# Patient Record
Sex: FEMALE | Race: WHITE | NOT HISPANIC OR LATINO | Employment: FULL TIME | URBAN - METROPOLITAN AREA
[De-identification: names, ages, dates, MRNs, and addresses within clinical notes are randomized per-mention and may not be internally consistent; named-entity substitution may affect disease eponyms.]

---

## 2018-05-30 ENCOUNTER — HOSPITAL ENCOUNTER (EMERGENCY)
Facility: MEDICAL CENTER | Age: 48
End: 2018-05-30
Attending: EMERGENCY MEDICINE
Payer: COMMERCIAL

## 2018-05-30 VITALS
BODY MASS INDEX: 42.52 KG/M2 | RESPIRATION RATE: 16 BRPM | HEIGHT: 66 IN | HEART RATE: 85 BPM | SYSTOLIC BLOOD PRESSURE: 125 MMHG | DIASTOLIC BLOOD PRESSURE: 88 MMHG | OXYGEN SATURATION: 97 % | WEIGHT: 264.55 LBS | TEMPERATURE: 97.8 F

## 2018-05-30 DIAGNOSIS — M79.601 PAIN OF RIGHT UPPER EXTREMITY: ICD-10-CM

## 2018-05-30 PROCEDURE — 99283 EMERGENCY DEPT VISIT LOW MDM: CPT

## 2018-05-30 RX ORDER — TRAMADOL HYDROCHLORIDE 50 MG/1
50 TABLET ORAL EVERY 8 HOURS PRN
Qty: 10 TAB | Refills: 0 | Status: SHIPPED | OUTPATIENT
Start: 2018-05-30 | End: 2018-06-02

## 2018-05-30 RX ORDER — SERTRALINE HYDROCHLORIDE 100 MG/1
200 TABLET, FILM COATED ORAL
COMMUNITY

## 2018-05-30 RX ORDER — CEPHALEXIN 500 MG/1
500 CAPSULE ORAL 4 TIMES DAILY
Qty: 40 CAP | Refills: 0 | Status: SHIPPED
Start: 2018-05-30 | End: 2021-12-12

## 2018-05-30 RX ORDER — OMEPRAZOLE 40 MG/1
40 CAPSULE, DELAYED RELEASE ORAL EVERY MORNING
COMMUNITY

## 2018-05-30 RX ORDER — HYDROCODONE BITARTRATE AND ACETAMINOPHEN 10; 325 MG/1; MG/1
1 TABLET ORAL
COMMUNITY

## 2018-05-30 ASSESSMENT — PAIN SCALES - GENERAL
PAINLEVEL_OUTOF10: 6
PAINLEVEL_OUTOF10: 4

## 2018-05-30 NOTE — DISCHARGE INSTRUCTIONS
"Carpal Tunnel Surgery  The carpal tunnel is a narrow hollow area in the wrist. It is formed by the wrist bones and ligaments. Nerves, blood vessels, and tendons on the palm side of your hand pass through the carpal tunnel. (The palm side is the side of your hand in the direction your fingers bend.) Repeated wrist motion or certain diseases may cause swelling within the tunnel. That is why these are sometimes called repetitive trauma disorders. It is also a common problem in late pregnancy because of water retention. This swelling pinches the main nerve in the wrist (median nerve). It causes the painful condition called carpal tunnel syndrome. A feeling of \"pins and needles\" may be noticed in the fingers or hand. The entire arm may ache from this condition. Carpal tunnel syndrome may clear up by itself. Cortisone injections may help. An electromyogram may be needed to confirm this diagnosis. This is a test which measures nerve conduction. The nerve conduction is usually slowed in a carpal tunnel syndrome. Sometimes, an operation may be needed to free the pinched nerve.   LET YOUR CAREGIVER KNOW ABOUT:  · Allergies  · Medications taken including herbs, eye drops, over the counter medications, and creams  · Use of steroids (by mouth or creams)  · Previous problems with anesthetics or novocaine  · Possibility of pregnancy, if this applies  · History of blood clots (thrombophlebitis)  · History of bleeding or blood problems  · Previous surgery  · Other health problems  RISKS AND COMPLICATIONS  · Infection: A germ starts growing in the wound. This can usually be treated with antibiotics.  · Damage to other organs may occur.  · Bleeding following surgery can be a complication of almost all surgeries. Your surgeon takes every precaution to keep this from happening.  · Recurrence (return) of carpal tunnel syndrome following treatment is rare.  BEFORE THE PROCEDURE  · Stop smoking at least two weeks prior to surgery. This " lowers risk during surgery.  · Stop non steroidal medicine for ten days prior to surgery. Also, do not take aspirin unless OK'd by your surgeon.  · Your caregiver may tell you to stop taking certain medicine that may affect the outcome of the surgery and your ability to heal. For example, you may need to stop taking anti-inflammatories, such as aspirin, because of possible bleeding problems. Other medicine may have interactions with anesthesia.  · BE SURE TO LET YOUR CAREGIVER KNOW IF YOU HAVE BEEN ON STEROIDS (INCLUDING CREAMS) FOR LONG PERIODS OF TIME. THIS IS CRITICAL.  · Your caregiver will discuss possible risks and complications with you before surgery. In addition to the usual risks of anesthesia, other common risks and complications include:  · Temporary increase in pain due to surgery.  · Uncorrected pain.  · Infection.  You should be present 60 minutes before your procedure or as directed.   PROCEDURE  · Carpal tunnel release is generally recommended if symptoms last for 6 months. Surgery involves severing the band of tissue around the wrist to reduce pressure on the median nerve. Surgery is done under local anesthesia and does not require an overnight hospital stay. Many patients require surgery on both hands. The following are types of carpal tunnel release surgery:  · Open release surgery, the traditional procedure used to correct carpal tunnel syndrome, consists of making an incision up to 2 inches in the wrist and then cutting the carpal ligament to enlarge the carpal tunnel. The procedure is generally done under local anesthesia on an outpatient basis, unless there are unusual medical considerations.  · Endoscopic surgery may allow faster functional recovery and less post-operative discomfort than traditional open release surgery. The surgeon makes two incisions (cuts) (about 1/2 inch each) in the wrist and palm, inserts a camera attached to a tube, looks at the tissue on a screen, and cuts the  carpal ligament (the tissue that holds joints together). This two-portal endoscopic surgery, generally performed under local anesthesia, is effective and minimizes scarring and scar tenderness, if any. One-portal endoscopic surgery for carpal tunnel syndrome is also available.  Although symptoms may be better right after surgery, full recovery from carpal tunnel surgery can take months. Some patients may have infection, nerve damage, stiffness, and pain at the scar. Sometimes the wrist loses strength because the carpal ligament is cut. Patients should take part in physical therapy after surgery to restore wrist strength. Some patients may need to adjust job duties or even change jobs after recovery from surgery.  The majority of patients recover completely without complications (additional problems).  AFTER THE PROCEDURE  After surgery, you will be taken to the recovery area where a nurse will watch and check your progress. Once you're awake, stable, and taking fluids well, without other problems you will be allowed to go home.  HOME CARE INSTRUCTIONS   · Once at home, an ice pack applied to your operative site may help with discomfort and keep the swelling down.  · Follow your caregiver's instructions as to activities, exercises, physical therapy, and driving a car.  · Maintain strength and range of motion as instructed.  · If you were given a splint to keep your wrist from bending, use it as instructed. It is important to wear the splint at night. Use the splint for as long as you have pain or numbness in your hand, arm or wrist. This may take 1 to 2 months.  · If you have pain at night, it may help to elevate your hand above the level of your heart (the center of your chest).  · It is important to avoid activities which originally caused your carpal tunnel syndrome for a couple weeks following surgery, or as directed by your surgeon. If your symptoms are work-related, you may need to talk to your employer about  changing to a job that does not require using your wrist.  · Only take over-the-counter or prescription medicines for pain, discomfort, or fever as directed by your caregiver.  · Following periods of extended use, particularly hard (strenuous) use, apply an ice pack wrapped in a towel to the palm (anterior) side of the affected wrist for 20 to 30 minutes. Repeat as needed three to four times per day. This will help reduce swelling following surgery.  SEEK MEDICAL CARE IF:   · There is increased bleeding (more than a small spot) from the wound.  · You notice redness, swelling, or increasing pain in the wound.  · Pus is coming from wound.  · An unexplained oral temperature above 102° F (38.9° C) develops.  · You notice a foul smell coming from the wound or dressing.  SEEK IMMEDIATE MEDICAL CARE IF:   · You develop a rash.  · You have difficulty breathing.  · You have any problems you think are related to allergies.  Document Released: 08/01/2005 Document Revised: 03/11/2013 Document Reviewed: 10/23/2008  [employer]® Patient Information ©2014 [employer].

## 2018-05-30 NOTE — ED PROVIDER NOTES
"ED Provider Note    CHIEF COMPLAINT  Chief Complaint   Patient presents with   • Post-Op Pain     s/p rt hand carpal tunnel surgery, 5d ago, +bruising, +swelling       HPI  Janine Capellan is a 47 y.o. female here for evaluation of post op pain.     PAST MEDICAL HISTORY   has a past medical history of GERD (gastroesophageal reflux disease).    SOCIAL HISTORY  Social History     Social History Main Topics   • Smoking status: Current Some Day Smoker     Packs/day: 0.25   • Smokeless tobacco: Not on file   • Alcohol use Yes      Comment: occational    • Drug use: No   • Sexual activity: Not on file       SURGICAL HISTORY   has a past surgical history that includes hysterectomy laparoscopy and other orthopedic surgery.    CURRENT MEDICATIONS  Home Medications     Reviewed by Zve Rodriguez R.N. (Registered Nurse) on 05/30/18 at 1429  Med List Status: Partial   Medication Last Dose Status   Hydrocodone-Acetaminophen (NORCO PO)  Active   HYDROXYZINE PAMOATE PO  Active   OMEPRAZOLE PO  Active   quetiapine (SEROQUEL) 100 MG TABS  Active   quetiapine (SEROQUEL) 25 MG TABS  Active   SERTRALINE HCL PO  Active   vitamin D 2000 UNIT TABS  Active                ALLERGIES  No Known Allergies    REVIEW OF SYSTEMS  See HPI for further details. Review of systems as above, otherwise all other systems are negative.     PHYSICAL EXAM  VITAL SIGNS: /84   Pulse 95   Temp 36.6 °C (97.8 °F) (Temporal)   Resp 16   Ht 1.676 m (5' 6\")   Wt 120 kg (264 lb 8.8 oz)   LMP 10/24/2009   SpO2 92%   BMI 42.70 kg/m²     Constitutional: Well developed, well nourished. No acute distress.  HEENT: Normocephalic, atraumatic. MMM  Neck: Supple, Full range of motion   Chest/Pulmonary:  No respiratory distress.  Equal expansion   Musculoskeletal: No deformity, no edema, neurovascular intact.  Right upper ext:  Incision site to the volar wrist.   Mild edema noted to the distal volar wrist.   No erythema. No induration.  No drainage or exudate " from the incision site. n/v intact distally.   Neuro: Clear speech, appropriate, cooperative, cranial nerves II-XII grossly intact.  Psych: Normal mood and affect      PROCEDURES     MEDICAL RECORD  I have reviewed patient's medical record and pertinent results are listed above.    COURSE & MEDICAL DECISION MAKING  I have reviewed any medical record information, laboratory studies and radiographic results as noted above.    3:06 PM  The pt was placed in a splint, and given antibiotics secondary to the recent surgery (on May 25 th), but there  Is no current signs of infection.  There is a slight bogginess to the arm, but no actual abscess or induration.  The pain is only in the area surrounding the incision site, she has good cap refill, and she Is neurovascularly intact distally.  No need for an u/s at this time.      I you have had any blood pressure issues while here in the emergency department, please see your doctor for a further evaluation or work up.    Differential diagnoses include but not limited to: dvt, abscess, cellulitis, post op pain    This patient presents with right arm pain.  At this time, I have counseled the patient/family regarding their medications, pain control, and follow up.  They will continue their medications, if any, as prescribed.  They will return immediately for any worsening symptoms and/or any other medical concerns.  They will see their doctor, or contact the doctor provided, in 1-2 days for follow up.       FINAL IMPRESSION  1. Pain of right upper extremity            Electronically signed by: Philip Yo, 5/30/2018 3:04 PM

## 2018-05-30 NOTE — ED TRIAGE NOTES
Pt amb to triage.  Chief Complaint   Patient presents with   • Post-Op Pain     s/p rt hand carpal tunnel surgery, 5d ago, +bruising, +swelling

## 2021-10-25 ENCOUNTER — APPOINTMENT (OUTPATIENT)
Dept: RADIOLOGY | Facility: MEDICAL CENTER | Age: 51
End: 2021-10-25
Attending: EMERGENCY MEDICINE
Payer: COMMERCIAL

## 2021-10-25 ENCOUNTER — HOSPITAL ENCOUNTER (EMERGENCY)
Facility: MEDICAL CENTER | Age: 51
End: 2021-10-25
Attending: EMERGENCY MEDICINE
Payer: COMMERCIAL

## 2021-10-25 VITALS
OXYGEN SATURATION: 95 % | HEART RATE: 110 BPM | WEIGHT: 275.57 LBS | BODY MASS INDEX: 44.29 KG/M2 | SYSTOLIC BLOOD PRESSURE: 166 MMHG | DIASTOLIC BLOOD PRESSURE: 86 MMHG | HEIGHT: 66 IN | TEMPERATURE: 97.1 F | RESPIRATION RATE: 17 BRPM

## 2021-10-25 DIAGNOSIS — S52.501A CLOSED FRACTURE DISTAL RADIUS AND ULNA, RIGHT, INITIAL ENCOUNTER: ICD-10-CM
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
## 2021-10-25 DIAGNOSIS — S80.211A ABRASION OF RIGHT KNEE, INITIAL ENCOUNTER: ICD-10-CM

## 2021-10-25 DIAGNOSIS — S52.601A CLOSED FRACTURE DISTAL RADIUS AND ULNA, RIGHT, INITIAL ENCOUNTER: ICD-10-CM

## 2021-10-25 PROCEDURE — 302874 HCHG BANDAGE ACE 2 OR 3""

## 2021-10-25 PROCEDURE — A9270 NON-COVERED ITEM OR SERVICE: HCPCS | Performed by: EMERGENCY MEDICINE

## 2021-10-25 PROCEDURE — 73110 X-RAY EXAM OF WRIST: CPT | Mod: RT

## 2021-10-25 PROCEDURE — 99284 EMERGENCY DEPT VISIT MOD MDM: CPT

## 2021-10-25 PROCEDURE — 29125 APPL SHORT ARM SPLINT STATIC: CPT

## 2021-10-25 PROCEDURE — 700102 HCHG RX REV CODE 250 W/ 637 OVERRIDE(OP): Performed by: EMERGENCY MEDICINE

## 2021-10-25 PROCEDURE — 73562 X-RAY EXAM OF KNEE 3: CPT | Mod: RT

## 2021-10-25 RX ORDER — HYDROCODONE BITARTRATE AND ACETAMINOPHEN 5; 325 MG/1; MG/1
1 TABLET ORAL ONCE
Status: COMPLETED | OUTPATIENT
Start: 2021-10-25 | End: 2021-10-25

## 2021-10-25 RX ORDER — HYDROCODONE BITARTRATE AND ACETAMINOPHEN 5; 325 MG/1; MG/1
1 TABLET ORAL EVERY 6 HOURS PRN
Qty: 10 TABLET | Refills: 0 | Status: SHIPPED | OUTPATIENT
Start: 2021-10-25 | End: 2021-10-28

## 2021-10-25 RX ADMIN — HYDROCODONE BITARTRATE AND ACETAMINOPHEN 1 TABLET: 5; 325 TABLET ORAL at 21:43

## 2021-10-25 RX ADMIN — HYDROCODONE BITARTRATE AND ACETAMINOPHEN 1 TABLET: 5; 325 TABLET ORAL at 19:28

## 2021-10-26 NOTE — ED NOTES
Splint applied by ED tech and pt medicated per MAR. Reviewed discharge instructions, pt verbalized understanding of instructions and medication. States she will schedule follow-up appointment. Denies further questions at this time. Pt ambulatory out of ER with steady gait.

## 2021-10-26 NOTE — ED TRIAGE NOTES
Pt amb to triage.  Chief Complaint   Patient presents with   • T-5000 GLF     slip and fall on the ice    • Wrist Pain     rt   • Knee Pain     rt     Rt wrist swelling noted. +CMS. Rt arm placed in a sling, ice applied.

## 2021-10-26 NOTE — ED PROVIDER NOTES
ED Provider Note    Scribed for Bia Mar M.D. by Radha Scott. 10/25/2021  6:59 PM    Primary care provider: Pcp Pt States None  Means of arrival: walk-in  History obtained from: patient  History limited by: none    CHIEF COMPLAINT  Chief Complaint   Patient presents with    T-5000 GLF     slip and fall on the ice     Wrist Pain     rt    Knee Pain     rt       HPI  Janine Capellan is a 51 y.o. female who presents to the Emergency Department for evaluation injuries following a ground level fall onset 4:15 PM today. The patient was out at [place] playing in the snow with her children when she trip on her and fell on ice. She reports also having mild right knee pain. She's had moderate right wrist pain that starts from her right thumb/wrist and radiates to her right elbow. She admits to associated symptoms of right wrist swelling and a sore right shoulder, but denies fevers or numbness. No alleviating factors were reported. 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 She denies breaking her right wrist before. She denies any use of daily medication.    REVIEW OF SYSTEMS  Neuro: no numbness  Musculoskeletal: Right wrist pain, right wrist swelling, sore right shoulder, right knee pain  Endocrine: no fevers    See history of present illness. All other systems are negative. E.    PAST MEDICAL HISTORY   has a past medical history of GERD (gastroesophageal reflux disease).    SURGICAL HISTORY   has a past surgical history that includes hysterectomy laparoscopy and other orthopedic surgery.    SOCIAL HISTORY  Social History     Tobacco Use    Smoking status: Current Some Day Smoker     Packs/day: 0.25   Substance Use Topics    Alcohol use: Yes     Comment: occational     Drug use: No      Social History     Substance and Sexual Activity   Drug Use No       FAMILY HISTORY  No family history reported.     CURRENT MEDICATIONS  Home Medications       Reviewed by Zev Rodriguez R.N. (Registered Nurse) on 10/25/21 at  "1758  Med List Status: Partial     Medication Last Dose Status   cephALEXin (KEFLEX) 500 MG Cap  Active   Hydrocodone-Acetaminophen (NORCO PO)  Active   HYDROXYZINE PAMOATE PO  Active   OMEPRAZOLE PO  Active   quetiapine (SEROQUEL) 100 MG TABS  Active   quetiapine (SEROQUEL) 25 MG TABS  Active   SERTRALINE HCL PO  Active   vitamin D 2000 UNIT TABS  Active                    ALLERGIES  No Known Allergies    PHYSICAL EXAM  VITAL SIGNS: BP (!) 181/95   Pulse 78   Temp 35.8 °C (96.5 °F) (Temporal)   Resp 16   Ht 1.676 m (5' 6\")   Wt 125 kg (275 lb 9.2 oz)   LMP 10/24/2009   SpO2 93%   BMI 44.48 kg/m²     Constitutional: No distress  Musculoskeletal: Abrasion on right knee, history of tibial subluxation which was hurting before the injury, Right wrist has a deformity over radius and ulna, Tenderness over right thumb, decreased range and pain in right wrist.  Vascular: warm to touch good capillary refill   Neurologic: distally neurovascularly intact  Psychiatric: Affect normal     DIAGNOSTIC STUDIES / PROCEDURES    RADIOLOGY  DX-WRIST-COMPLETE 3+ RIGHT   Final Result      1.  Comminuted impacted acute fracture of the distal radius.      2.  Styloid fracture also identified.      DX-KNEE 3 VIEWS RIGHT   Final Result         1.  No acute traumatic bony injury.        The radiologist's interpretation of all radiological studies have been reviewed by me.    COURSE & MEDICAL DECISION MAKING  Nursing notes, VS, PMSFHx reviewed in chart.    6:59 PM Patient seen and examined at bedside. Patient will be treated with Norco 5 - 325 mg tablet.  Ordered DX-knee-right and DX-wrist-right to evaluate her symptoms. The differential diagnoses include but are not limited to: fracture vs contusion     8:47 PM Patient was reevaluated at bedside. Discussed radiology results with the patient and informed them that she fracture her wrist. The patient sees a doctor in [place] and is currently on chronic narcotics. She informed me that " she currently does not have access to her pain medication. I informed the patient that I can provide her pain medication, however I might be messing up her pain medication contract. She still requested the pills and verbalized an understanding.  I recommend the patient keeps her wrist in a splint and that she rests, elevates, and ices her injury. I answered the patient's questions at this time. I discussed plans for discharge at this time. Patient verbalizes understanding and agreement to this plan of care.      I reviewed prescription monitoring program for patient's narcotic use before prescribing a scheduled drug.The patient will not drink alcohol nor drive with prescribed medications. The patient will return for new or worsening symptoms and is stable at the time of discharge.    The patient is referred to a primary physician for blood pressure management, diabetic screening, and for all other preventative health concerns.    In prescribing controlled substances to this patient, I certify that I have obtained and reviewed the medical history of Janine Capellan. I have also made a good jody effort to obtain applicable records from other providers who have treated the patient and records demonstrating the following: Chronic use of narcotics but the patient states that they are in [place] and she does not have them available. .     I have conducted a physical exam and documented it. I have reviewed Ms. Capellan’s prescription history as maintained by the [place] Prescription Monitoring Program.     I have assessed the patient’s risk for abuse, dependency, and addiction using the validated Opioid Risk Tool available at https://www.mdcalc.com/letlmi-ilmz-ozxb-ort-narcotic-abuse.     Given the above, I believe the benefits of controlled substance therapy outweigh the risks. The reasons for prescribing controlled substances include the patient having no access to her already prescribed narcotics. I informed her that I  might be referring with her pain medication contract. She verbalized an understanding.  Accordingly, I have discussed the risk and benefits, treatment plan, and alternative therapies with the patient.       DISPOSITION:  Patient will be discharged home in stable condition.    FOLLOW UP:  Foster Morales M.D.  [address] [place] [address]  [phone]    Call in 1 day  to establish care, for recheck      OUTPATIENT MEDICATIONS:  New Prescriptions    HYDROCODONE-ACETAMINOPHEN (NORCO) 5-325 MG TAB PER TABLET    Take 1 Tablet by mouth every 6 hours as needed for up to 3 days.         FINAL IMPRESSION  1. Fall, initial encounter    2. Closed fracture distal radius and ulna, right, initial encounter    3. Abrasion of right knee, initial encounter          IRadha (Scribe), am scribing for, and in the presence of, Bia Mar M.D..    Electronically signed by: Radha Scott (Scribe), 10/25/2021    IBia M.D. personally performed the services described in this documentation, as scribed by Radha Scott in my presence, and it is both accurate and complete.    The note accurately reflects work and decisions made by me.  Bia Mar M.D.  10/25/2021  9:43 PM

## 2021-10-30 ENCOUNTER — APPOINTMENT (OUTPATIENT)
Dept: RADIOLOGY | Facility: MEDICAL CENTER | Age: 51
End: 2021-10-30
Attending: EMERGENCY MEDICINE
Payer: COMMERCIAL

## 2021-10-30 ENCOUNTER — HOSPITAL ENCOUNTER (EMERGENCY)
Facility: MEDICAL CENTER | Age: 51
End: 2021-10-30
Attending: EMERGENCY MEDICINE
Payer: COMMERCIAL

## 2021-10-30 VITALS
BODY MASS INDEX: 43.79 KG/M2 | RESPIRATION RATE: 16 BRPM | HEIGHT: 66 IN | WEIGHT: 272.49 LBS | TEMPERATURE: 97.5 F | DIASTOLIC BLOOD PRESSURE: 90 MMHG | HEART RATE: 86 BPM | SYSTOLIC BLOOD PRESSURE: 152 MMHG | OXYGEN SATURATION: 94 %

## 2021-10-30 DIAGNOSIS — S52.571A OTHER CLOSED INTRA-ARTICULAR FRACTURE OF DISTAL END OF RIGHT RADIUS, INITIAL ENCOUNTER: ICD-10-CM

## 2021-10-30 PROCEDURE — 302875 HCHG BANDAGE ACE 4 OR 6""

## 2021-10-30 PROCEDURE — 700102 HCHG RX REV CODE 250 W/ 637 OVERRIDE(OP): Performed by: EMERGENCY MEDICINE

## 2021-10-30 PROCEDURE — 93971 EXTREMITY STUDY: CPT | Mod: RT

## 2021-10-30 PROCEDURE — 29125 APPL SHORT ARM SPLINT STATIC: CPT

## 2021-10-30 PROCEDURE — 302874 HCHG BANDAGE ACE 2 OR 3""

## 2021-10-30 PROCEDURE — A9270 NON-COVERED ITEM OR SERVICE: HCPCS | Performed by: EMERGENCY MEDICINE

## 2021-10-30 PROCEDURE — 73110 X-RAY EXAM OF WRIST: CPT | Mod: RT

## 2021-10-30 PROCEDURE — 99284 EMERGENCY DEPT VISIT MOD MDM: CPT

## 2021-10-30 RX ORDER — IBUPROFEN 600 MG/1
600 TABLET ORAL ONCE
Status: COMPLETED | OUTPATIENT
Start: 2021-10-30 | End: 2021-10-30

## 2021-10-30 RX ORDER — IBUPROFEN 800 MG/1
800 TABLET ORAL EVERY 8 HOURS PRN
Qty: 15 TABLET | Refills: 0 | Status: SHIPPED | OUTPATIENT
Start: 2021-10-30 | End: 2021-11-04

## 2021-10-30 RX ADMIN — IBUPROFEN 600 MG: 600 TABLET, FILM COATED ORAL at 18:00

## 2021-10-30 NOTE — LETTER
Janine Capellan was seen and treated in our emergency department on 10/30/2021.  She is to be non-weight bearing and not use her Right arm until cleared after follow-up with orthopedics. Until then, she will need assistance from her daughter to help care for her family. Please excuse her daughter from work to be able to help Janine until she is cleared.    If you have any questions or concerns, please don't hesitate to call.      Emergency Department

## 2021-10-30 NOTE — ED TRIAGE NOTES
Chief Complaint   Patient presents with   • Follow-Up     pt here to get her splint fixed and to get re-evaluated on her right arm, d/t arm still swollen since monday. diagnosed w/ right wrist comminuted fracture. rates pain as 9/10     Pt having shooting pain in left arm radiating to her elbow. Educated on triage process. Instructed to notify staff for any worsening symptoms. Denies any recent travel. Denies exposure to known covid positive patients. Denies any respiratory symptoms.

## 2021-10-30 NOTE — ED NOTES
Right arm splint in place. Cap refil <3 second to finger. Pt reports intermittent numbness/tingling to fingers and a shooting pain through wrist. She reports her sling was too small and therefore she has not been able to wear it. She states she is no medical and cannot obtained surgery in the [place] area. She is in contact with her primary care in [place] and is attempting to obtained ortho follow up at home, but is not sure when she will be able to get there.

## 2021-10-30 NOTE — ED PROVIDER NOTES
ED Provider Note    Scribed for Elizabeth Ashley M.D. by Tiff Rodríguez. 10/30/2021  4:07 PM    Primary care provider: Pcp Pt States None  Means of arrival: Walk in  History obtained from: patient   History limited by: none  CHIEF COMPLAINT  Chief Complaint   Patient presents with   • Follow-Up     pt here to get her splint fixed and to get re-evaluated on her right arm, d/t arm still swollen since monday. diagnosed w/ right wrist comminuted fracture. rates pain as 9/10       HPI  Janine Capellan is a 51 y.o. female who presents for increase in right arm swelling and pain onset 3 days ago . Patient had a fracture to her right wrist status post fall and was seen here in the ER at [facility] on Monday, October 25.  She sustained a distal radius fracture and was placed in a sugar tong splint.  She said she did fine for a couple days.  However, over the last 2 days she has noticed increased swelling, pain, and tingling to her pinky finger. She describes her pain as throbbing with occasional sharp pains that radiate up to her elbow. Patient endorses having her primary loosen her splint in [place] a few days ago but her symptoms were not alleviated.  She is having a hard time getting into an orthopedic surgeon because she has [facility].  Her primary care physician in [facility] is working on trying to get her a referral.  She was unable to follow-up with the physicians at the [facility] because they required cash upfront and she does not have that kind of money.  She endorses elevating her arm as much as possible and applying ice without alleviation of her symptoms.  Denies history of blood clots.     REVIEW OF SYSTEMS  See HPI for further details.  Positive for arm swelling and pain, tingling to left finger.  Negative for discoloration to the hand, fevers, chills.    PAST MEDICAL HISTORY  Past Medical History:   Diagnosis Date   • GERD (gastroesophageal reflux disease)      SOCIAL  "HISTORY  Social History     Socioeconomic History   • Marital status: Single   Tobacco Use   • Smoking status: Current Some Day Smoker     Packs/day: 0.25   Substance and Sexual Activity   • Alcohol use: Yes     Comment: occational    • Drug use: No     SURGICAL HISTORY  Past Surgical History:   Procedure Laterality Date   • HYSTERECTOMY LAPAROSCOPY     • OTHER ORTHOPEDIC SURGERY      right femur, knee       CURRENT MEDICATIONS  Home Medications     Reviewed by Kaelyn Xavier R.N. (Registered Nurse) on 10/30/21 at 1534  Med List Status: Partial   Medication Last Dose Status   cephALEXin (KEFLEX) 500 MG Cap  Active   Hydrocodone-Acetaminophen (NORCO PO)  Active   HYDROXYZINE PAMOATE PO  Active   OMEPRAZOLE PO  Active   quetiapine (SEROQUEL) 100 MG TABS  Active   quetiapine (SEROQUEL) 25 MG TABS  Active   SERTRALINE HCL PO  Active   vitamin D 2000 UNIT TABS  Active                ALLERGIES  No Known Allergies    PHYSICAL EXAM  VITAL SIGNS: /97   Pulse 94   Temp 36.3 °C (97.3 °F) (Temporal)   Resp 19   Ht 1.676 m (5' 6\")   Wt 124 kg (272 lb 7.8 oz)   LMP 10/24/2009   SpO2 91%   BMI 43.98 kg/m²    Constitutional: Alert in no apparent distress.  HENT: Normocephalic, Bilateral external ears normal. Nose normal.   Eyes: Pupils are equal and reactive. Conjunctiva normal, non-icteric.   Thorax & Lungs: Easy unlabored respirations  Skin: Visualized skin is  Dry, No erythema, No rash.   Extremities:   Right upper extremity was in a sugar tong splint.  I remove the sugar tong splint.  There is some old appearing ecchymosis over the volar surface of the wrist as well as the dorsal surface of the wrist and upper hand.  2+ radial pulse.  Full range of motion to the digits.  Radial, median, ulnar nerve function is intact.  She does describe decrease sensation to light touch to her pinky finger and the ulnar aspect of the hand.  Good capillary refill.  No tenderness to the elbow.  There is some tenderness to the distal " radius.  The compartments of the forearm are soft.  No signs of compartment syndrome.  No pain in elbow with flexion, extension, pronation or supination.  Musculoskeletal: Good range of motion in all major joints. No tenderness to palpation or major deformities noted.   Neurologic: Alert, clear speech  Psychiatric: Affect and Mood normal    RADIOLOGY    DX-WRIST-COMPLETE 3+ RIGHT   Final Result      1.  Comminuted and impacted intra-articular fracture distal RIGHT radius again seen, unchanged from prior.   2.  Ulnar styloid fracture is unchanged.   3.  No dislocation.   4.  No callus formation.      US-EXTREMITY VENOUS UPPER UNILAT RIGHT   Final Result    CONCLUSIONS   1.  There is no evidence or RIGHT upper extremity DVT or SVT.    Impression:  Right upper extremity.    All veins demonstrate complete color filling and compressibility with    normal venous flow dynamics including spontaneous flow and respiratory    phasicity.       Flow was evaluated in the contralateral subclavian vein and normal venous    flow dynamics including spontaneous flow and respiratory phasic variation    were demonstrated.       COURSE & MEDICAL DECISION MAKING  Pertinent Labs & Imaging studies reviewed. (See chart for details)    Reviewed patient's old medical records which showed she was seen the 25th with a distal radius fracture. She was splinted and referred to ortho. Patient is from [place] and receives chronic narcotics. I reviewed the patient  which showed she received 150 10 mg norco monthly and was given a prescription for pain medication during her last visit here.     4:07 PM - Patient seen and examined at bedside. Discussed plan of care, including repeat x-ray and replacing her splint. Patient agrees to the plan of care. Ordered for right wrist x-ray and US right upper extremity to evaluate her symptoms.     5:39 PM - Reviewed the patient's imaging results.  Paged orthopedics.     5:43 PM - I discussed the patient's  case and the above findings with Dr. Barboza (orthopedics) who recommended replacing the splint. He states the tingling is likely from the spint. Her Fracture well aligned and he thinks she can be treated either operatively or non operative, but due to her good alignment, she will do well nonoperatively. Dr. Barboza recommended follow up with 7 days by orthopedist either here or [place]. Recommneded ssugar tong splint, ice, elevating and mortin. We will reapply her splint and she will be treated with motrin 600 mg. Prescribed Motrin. Discussed return precautions. Patient will be discharged at this time. She verbalizes agreement with discharge and plan of care.    Patient presents to the ER complaining of swelling and increased discomfort of the last 2 days in her right upper extremity.  On October 25, she slipped on ice while sledding with her grandchildren.  She has a distal radius fracture.  She was seen here in the emergency department.  She was placed in a sugar tong splint.  She has [employer] and said that the orthopedic surgeon on-call would not see her unless she could pay $300 upfront.  She went to [facility] to follow-up with her primary care physician.  Her primary care physician is trying to get a referral to an orthopedist in [place] that takes [employer].  Patient complains of some decrease sensation to her pinky finger.  She does have decreased sensation to light touch over the pinky and the ulnar aspect of the hand.  I spoke to the orthopedic surgeon on call.  He thinks this is secondary to the splint.  I removed the splint.  The forearm looks good.  The compartments of the forearm are soft.  She is got a good radial pulse.  She has good sensation to light touch to all the other fingers.  She has full range of motion to her digits.  She has good capillary refill.  There is no ecchymosis anywhere other than the dorsal and volar surface of the distal radius in the area of the fracture site.   No discoloration to the hand or fingers.  This time no signs of compartment syndrome.  Ultrasound of the right upper extremity was performed.  Is negative for DVT.  X-ray was repeated here today and reveals good alignment of the fracture fragments per Dr. Barboza.  He said patient can be treated either operatively or nonoperatively for this fracture.  He would like the patient to be placed back in a sugar tong splint.  He would like her to follow-up with an orthopedist within the next 7 days.  Patient tells me she is going to contact her primary care physician on Monday to see if she has any success in getting her referred out to a orthopedist in [place].  At this time I think she is safe and stable for outpatient management discharge home.  Orthopedic surgeon recommends elevation, Motrin and ice to help with the swelling and the pain.  I will write her prescription for high-dose Motrin.  She has been given strict return precautions and discharge instructions and she understands treatment plan and follow-up.    The patient will return for new or worsening symptoms and is stable at the time of discharge.    DISPOSITION:  Patient will be discharged home in stable condition.    FOLLOW UP:  Kamran Barboza M.D.  [address]  [facility] [address]  [phone]    Schedule an appointment as soon as possible for a visit in 1 week  If symptoms worsen return to ER      OUTPATIENT MEDICATIONS:  New Prescriptions    IBUPROFEN (MOTRIN) 800 MG TAB    Take 1 Tablet by mouth every 8 hours as needed for up to 5 days.       FINAL IMPRESSION  1. Other closed intra-articular fracture of distal end of right radius, initial encounter Acute        This dictation has been created using voice recognition software. The accuracy of the dictation is limited by the abilities of the software. I expect there may be some errors of grammar and possibly content. I made every attempt to manually correct the errors within my dictation.  However, errors related to voice recognition software may still exist and should be interpreted within the appropriate context.     Tiff BUI (Natacha), am scribing for, and in the presence of, Elizabeth Ashley M.D..    Electronically signed by: Tiff Rodríguez (Natacha), 10/30/2021    Elizabeth BUI M.D. personally performed the services described in this documentation, as scribed by Tiff Rodríguez in my presence, and it is both accurate and complete.    The note accurately reflects work and decisions made by me.  Elizabeth Ashley M.D.  10/30/2021  6:20 PM

## 2021-10-31 NOTE — DISCHARGE INSTRUCTIONS
Follow-up with the [place] [facility] or an orthopedist in [place] within the next 1 week.  Please call for appointment.    Ice your hand/wrist is much as possible.  This will help with the swelling and the pain.    Elevate your arm as much as possible.  This help with the swelling and the pain.    Return to the ER for any worsening pain, worsening swelling, discoloration to your fingers, pain at a proportion to what you would expect, increased numbness/tingling to your fingers or hand, or for any concerns.

## 2021-12-12 ENCOUNTER — APPOINTMENT (OUTPATIENT)
Dept: RADIOLOGY | Facility: MEDICAL CENTER | Age: 51
End: 2021-12-12
Attending: EMERGENCY MEDICINE
Payer: COMMERCIAL

## 2021-12-12 ENCOUNTER — HOSPITAL ENCOUNTER (EMERGENCY)
Facility: MEDICAL CENTER | Age: 51
End: 2021-12-12
Attending: EMERGENCY MEDICINE
Payer: COMMERCIAL

## 2021-12-12 VITALS
DIASTOLIC BLOOD PRESSURE: 92 MMHG | OXYGEN SATURATION: 93 % | RESPIRATION RATE: 18 BRPM | BODY MASS INDEX: 45.53 KG/M2 | WEIGHT: 283.29 LBS | HEART RATE: 93 BPM | SYSTOLIC BLOOD PRESSURE: 151 MMHG | HEIGHT: 66 IN | TEMPERATURE: 97.6 F

## 2021-12-12 DIAGNOSIS — L03.116 CELLULITIS OF LEFT FOOT: ICD-10-CM

## 2021-12-12 DIAGNOSIS — M79.672 LEFT FOOT PAIN: ICD-10-CM

## 2021-12-12 PROCEDURE — 700102 HCHG RX REV CODE 250 W/ 637 OVERRIDE(OP): Performed by: EMERGENCY MEDICINE

## 2021-12-12 PROCEDURE — A9270 NON-COVERED ITEM OR SERVICE: HCPCS | Performed by: EMERGENCY MEDICINE

## 2021-12-12 PROCEDURE — 73630 X-RAY EXAM OF FOOT: CPT | Mod: LT

## 2021-12-12 PROCEDURE — 99283 EMERGENCY DEPT VISIT LOW MDM: CPT

## 2021-12-12 RX ORDER — CEPHALEXIN 500 MG/1
500 CAPSULE ORAL 4 TIMES DAILY
Qty: 20 CAPSULE | Refills: 0 | Status: SHIPPED | OUTPATIENT
Start: 2021-12-12 | End: 2021-12-17

## 2021-12-12 RX ORDER — CEPHALEXIN 500 MG/1
500 CAPSULE ORAL ONCE
Status: COMPLETED | OUTPATIENT
Start: 2021-12-12 | End: 2021-12-12

## 2021-12-12 RX ADMIN — CEPHALEXIN 500 MG: 500 CAPSULE ORAL at 19:35

## 2021-12-13 NOTE — ED TRIAGE NOTES
"Chief Complaint   Patient presents with   • Foot Pain       50 yo female to triage for above complaint. Patient reports x1week ago she stepped on a nail in her house and has had increasing pain since then. Small cut noted to patients heel. No redness or swelling or drainage noted.    Pt is alert and oriented, speaking in full sentences, follows commands and responds appropriately to questions.     Patient placed back in lobby and educated on triage process. Asked to inform RN of any changes.    BP (!) 178/133   Pulse 94   Temp 36.2 °C (97.2 °F) (Temporal)   Resp 20   Ht 1.676 m (5' 6\")   Wt (!) 129 kg (283 lb 4.7 oz)   LMP 10/24/2009   SpO2 94%   BMI 45.72 kg/m²     "

## 2021-12-13 NOTE — ED PROVIDER NOTES
ED Provider Note    Scribed for Fer Barrera M.D. by Aye Calvert. 12/12/2021  6:56 PM    Primary care provider: Pcp Pt States None  Means of arrival: walk in  History obtained from: patient  History limited by: none noted    CHIEF COMPLAINT  Chief Complaint   Patient presents with   • Foot Pain       HPI  Janine Capellan is a 51 y.o. female who presents to the Emergency Department with left foot pain onset one week ago.  The patient reports she stepped on a nail while barefoot and began endorsing swelling. She states she tried to treat the wound with medication and Epsom salts, but it did not provide any alleviation. She notes she has difficulties ambulating and states the pain is worsened when she's resting her foot. She denies any fevers. She states she decided to visit [facility] ED when the wound site turned purple today and she had concerns that the nail broke off into her foot. The patient has a history of a Staph infection. She denies any history of injectable drugs.      REVIEW OF SYSTEMS  Pertinent positives include left foot pain, swelling, and color change. Pertinent negatives include no fevers.     PAST MEDICAL HISTORY   has a past medical history of GERD (gastroesophageal reflux disease).    SURGICAL HISTORY   has a past surgical history that includes hysterectomy laparoscopy and other orthopedic surgery.    SOCIAL HISTORY  Social History     Tobacco Use   • Smoking status: Current Some Day Smoker     Packs/day: 0.25   • Smokeless tobacco: Never Used   Vaping Use   • Vaping Use: Every day   • Substances: Nicotine   Substance Use Topics   • Alcohol use: Yes     Comment: occational    • Drug use: No      Social History     Substance and Sexual Activity   Drug Use No       FAMILY HISTORY  History reviewed. No pertinent family history.    CURRENT MEDICATIONS  Home Medications     Reviewed by Shahrzad Veliz R.N. (Registered Nurse) on 12/12/21 at 1752  Med List Status: Not Addressed   Medication Last  "Dose Status   cephALEXin (KEFLEX) 500 MG Cap  Active   Hydrocodone-Acetaminophen (NORCO PO)  Active   HYDROXYZINE PAMOATE PO  Active   OMEPRAZOLE PO  Active   quetiapine (SEROQUEL) 100 MG TABS  Active   quetiapine (SEROQUEL) 25 MG TABS  Active   SERTRALINE HCL PO  Active   vitamin D 2000 UNIT TABS  Active                ALLERGIES  No Known Allergies    PHYSICAL EXAM  VITAL SIGNS: /92   Pulse 94   Temp 36.2 °C (97.2 °F) (Temporal)   Resp 20   Ht 1.676 m (5' 6\")   Wt (!) 129 kg (283 lb 4.7 oz)   LMP 10/24/2009   SpO2 94%   BMI 45.72 kg/m²   Pulse ox interpretation: Normal  Constitutional: Well developed, Well nourished, No acute distress, Non-toxic appearance.   HENT: Normocephalic, Atraumatic  Cardiovascular: Normal heart rate, Normal rhythm  Thorax & Lungs: Normal breath sounds, No respiratory distress  Skin: Warm, Dry, No erythema, No rash.  Extremities: Tenderness in left heel with a healing wound to the plantar aspect of the heel.  No active discharge.  No surrounding erythema.  Does have surrounding tenderness however. Intact distal pulses, No edema,  No cyanosis, No clubbing.   Neurologic: Alert, No focal deficits noted.      RADIOLOGY  DX-FOOT-COMPLETE 3+ LEFT   Final Result      1.  There is no foreign body.   2.  There is no fracture.        The radiologist's interpretation of all radiological studies have been reviewed by me.    COURSE & MEDICAL DECISION MAKING  Pertinent Labs & Imaging studies reviewed. (See chart for details)    6:56 PM - Patient seen and examined at bedside. Patient will be treated with Keflex 500 mg. Ordered DX-Foot Left to evaluate her symptoms.     7:28 PM - Patient was reevaluated at bedside. Discussed radiology results with the patient and informed them that there were no fractures or foreign bodies found. Patient is stable for discharge and will be given a prescription for keflex 500 mg. ED return precautions discussed. Patient was given the opportunity to ask " questions and verbalizes agreement to the plan of care.   Left heel pain.  States that she stepped on the nail about    Decision Making:  This is a 51 y.o. year old female who presents with 1 week ago.  She was barefoot at the time.  She is uncertain if there is a foreign object in her foot.  X-ray was performed which is unremarkable.  She does have continued swelling and tenderness that area.  No obvious signs of infection however given the tenderness and subjective swelling to the foot, recommending short course of antibiotics for suspected cellulitis.  Neurovascular intact distally.  No obvious signs of necrotizing fasciitis or other deep space infection.  No fluctuance.  No purulent discharge.  Patient does not appear to have underlying immunocompromised conditions.  No signs of sepsis.    The patient will not drink alcohol nor drive with prescribed medications. The patient will return for new or worsening symptoms and is stable at the time of discharge. Patient was given return precautions. Patient and/or family member verbalizes understanding and will comply.    DISPOSITION:  Patient will be discharged home in stable condition.    FOLLOW UP:  [facility], Emergency Dept  [address] [place] [id number] [phone]    As needed, If symptoms worsen    Primary care doctor    Schedule an appointment as soon as possible for a visit         OUTPATIENT MEDICATIONS:  Discharge Medication List as of 12/12/2021  7:39 PM      START taking these medications    Details   cephALEXin (KEFLEX) 500 MG Cap Take 1 Capsule by mouth 4 times a day for 5 days., Disp-20 Capsule, R-0, Normal               FINAL IMPRESSION  1. Left foot pain    2. Cellulitis of left foot      I, Aye Calvert (Natacha), am scribing for, and in the presence of, No att. providers found.    Electronically signed by: Aye López), 12/12/2021    I, No att. providers found personally performed the services described  in this documentation, as scribed by Aye Calvert in my presence, and it is both accurate and complete. E    This dictation has been created using voice recognition software and/or scribes. The accuracy of the dictation is limited by the abilities of the software and the expertise of the scribes. I expect there may be some errors of grammar and possibly content. I made every attempt to manually correct the errors within my dictation. However, errors related to voice recognition software and/or scribes may still exist and should be interpreted within the appropriate context.    The note accurately reflects work and decisions made by me.  Fer Barrera M.D.  12/12/2021  9:31 PM

## 2022-06-05 ENCOUNTER — HOSPITAL ENCOUNTER (EMERGENCY)
Facility: MEDICAL CENTER | Age: 52
End: 2022-06-05
Attending: EMERGENCY MEDICINE
Payer: COMMERCIAL

## 2022-06-05 ENCOUNTER — APPOINTMENT (OUTPATIENT)
Dept: RADIOLOGY | Facility: MEDICAL CENTER | Age: 52
End: 2022-06-05
Attending: EMERGENCY MEDICINE
Payer: COMMERCIAL

## 2022-06-05 VITALS
TEMPERATURE: 98 F | SYSTOLIC BLOOD PRESSURE: 144 MMHG | OXYGEN SATURATION: 93 % | HEIGHT: 65 IN | HEART RATE: 83 BPM | BODY MASS INDEX: 45.73 KG/M2 | DIASTOLIC BLOOD PRESSURE: 84 MMHG | RESPIRATION RATE: 20 BRPM | WEIGHT: 274.47 LBS

## 2022-06-05 DIAGNOSIS — U07.1 PNEUMONIA DUE TO COVID-19 VIRUS: ICD-10-CM

## 2022-06-05 DIAGNOSIS — J12.82 PNEUMONIA DUE TO COVID-19 VIRUS: ICD-10-CM

## 2022-06-05 DIAGNOSIS — J22 LOWER RESPIRATORY TRACT INFECTION: ICD-10-CM

## 2022-06-05 LAB
ALBUMIN SERPL BCP-MCNC: 4.1 G/DL (ref 3.2–4.9)
ALBUMIN/GLOB SERPL: 1.2 G/DL
ALP SERPL-CCNC: 95 U/L (ref 30–99)
ALT SERPL-CCNC: 16 U/L (ref 2–50)
ANION GAP SERPL CALC-SCNC: 11 MMOL/L (ref 7–16)
AST SERPL-CCNC: 13 U/L (ref 12–45)
BASOPHILS # BLD AUTO: 0.3 % (ref 0–1.8)
BASOPHILS # BLD: 0.03 K/UL (ref 0–0.12)
BILIRUB SERPL-MCNC: 0.2 MG/DL (ref 0.1–1.5)
BUN SERPL-MCNC: 10 MG/DL (ref 8–22)
CALCIUM SERPL-MCNC: 9.3 MG/DL (ref 8.5–10.5)
CHLORIDE SERPL-SCNC: 101 MMOL/L (ref 96–112)
CO2 SERPL-SCNC: 27 MMOL/L (ref 20–33)
CREAT SERPL-MCNC: 0.55 MG/DL (ref 0.5–1.4)
D DIMER PPP IA.FEU-MCNC: 0.49 UG/ML (FEU) (ref 0–0.5)
EKG IMPRESSION: NORMAL
EOSINOPHIL # BLD AUTO: 0.15 K/UL (ref 0–0.51)
EOSINOPHIL NFR BLD: 1.4 % (ref 0–6.9)
ERYTHROCYTE [DISTWIDTH] IN BLOOD BY AUTOMATED COUNT: 52.3 FL (ref 35.9–50)
FLUAV RNA SPEC QL NAA+PROBE: NEGATIVE
FLUBV RNA SPEC QL NAA+PROBE: NEGATIVE
GFR SERPLBLD CREATININE-BSD FMLA CKD-EPI: 111 ML/MIN/1.73 M 2
GLOBULIN SER CALC-MCNC: 3.3 G/DL (ref 1.9–3.5)
GLUCOSE SERPL-MCNC: 90 MG/DL (ref 65–99)
HCT VFR BLD AUTO: 40.4 % (ref 37–47)
HGB BLD-MCNC: 12.5 G/DL (ref 12–16)
IMM GRANULOCYTES # BLD AUTO: 0.05 K/UL (ref 0–0.11)
IMM GRANULOCYTES NFR BLD AUTO: 0.5 % (ref 0–0.9)
LYMPHOCYTES # BLD AUTO: 4.5 K/UL (ref 1–4.8)
LYMPHOCYTES NFR BLD: 42.1 % (ref 22–41)
MCH RBC QN AUTO: 24.5 PG (ref 27–33)
MCHC RBC AUTO-ENTMCNC: 30.9 G/DL (ref 33.6–35)
MCV RBC AUTO: 79.2 FL (ref 81.4–97.8)
MONOCYTES # BLD AUTO: 0.79 K/UL (ref 0–0.85)
MONOCYTES NFR BLD AUTO: 7.4 % (ref 0–13.4)
NEUTROPHILS # BLD AUTO: 5.17 K/UL (ref 2–7.15)
NEUTROPHILS NFR BLD: 48.3 % (ref 44–72)
NRBC # BLD AUTO: 0 K/UL
NRBC BLD-RTO: 0 /100 WBC
PLATELET # BLD AUTO: 338 K/UL (ref 164–446)
PMV BLD AUTO: 9 FL (ref 9–12.9)
POTASSIUM SERPL-SCNC: 4.1 MMOL/L (ref 3.6–5.5)
PROT SERPL-MCNC: 7.4 G/DL (ref 6–8.2)
RBC # BLD AUTO: 5.1 M/UL (ref 4.2–5.4)
RSV RNA SPEC QL NAA+PROBE: NEGATIVE
SARS-COV-2 RNA RESP QL NAA+PROBE: DETECTED
SODIUM SERPL-SCNC: 139 MMOL/L (ref 135–145)
SPECIMEN SOURCE: ABNORMAL
TROPONIN T SERPL-MCNC: <6 NG/L (ref 6–19)
WBC # BLD AUTO: 10.7 K/UL (ref 4.8–10.8)

## 2022-06-05 PROCEDURE — A9270 NON-COVERED ITEM OR SERVICE: HCPCS | Performed by: EMERGENCY MEDICINE

## 2022-06-05 PROCEDURE — 84484 ASSAY OF TROPONIN QUANT: CPT

## 2022-06-05 PROCEDURE — 0241U HCHG SARS-COV-2 COVID-19 NFCT DS RESP RNA 4 TRGT MIC: CPT

## 2022-06-05 PROCEDURE — 93005 ELECTROCARDIOGRAM TRACING: CPT | Performed by: EMERGENCY MEDICINE

## 2022-06-05 PROCEDURE — 99284 EMERGENCY DEPT VISIT MOD MDM: CPT

## 2022-06-05 PROCEDURE — 80053 COMPREHEN METABOLIC PANEL: CPT

## 2022-06-05 PROCEDURE — C9803 HOPD COVID-19 SPEC COLLECT: HCPCS | Performed by: EMERGENCY MEDICINE

## 2022-06-05 PROCEDURE — 700102 HCHG RX REV CODE 250 W/ 637 OVERRIDE(OP): Performed by: EMERGENCY MEDICINE

## 2022-06-05 PROCEDURE — 71045 X-RAY EXAM CHEST 1 VIEW: CPT

## 2022-06-05 PROCEDURE — 93005 ELECTROCARDIOGRAM TRACING: CPT

## 2022-06-05 PROCEDURE — 85379 FIBRIN DEGRADATION QUANT: CPT

## 2022-06-05 PROCEDURE — 36415 COLL VENOUS BLD VENIPUNCTURE: CPT

## 2022-06-05 PROCEDURE — 85025 COMPLETE CBC W/AUTO DIFF WBC: CPT

## 2022-06-05 RX ORDER — ALBUTEROL SULFATE 90 UG/1
2 AEROSOL, METERED RESPIRATORY (INHALATION) EVERY 6 HOURS PRN
Qty: 8.5 G | Refills: 0 | Status: SHIPPED
Start: 2022-06-05

## 2022-06-05 RX ORDER — ALBUTEROL SULFATE 90 UG/1
2 AEROSOL, METERED RESPIRATORY (INHALATION) ONCE
Status: COMPLETED | OUTPATIENT
Start: 2022-06-05 | End: 2022-06-05

## 2022-06-05 RX ORDER — BENZONATATE 100 MG/1
100 CAPSULE ORAL 3 TIMES DAILY PRN
Qty: 21 CAPSULE | Refills: 0 | Status: SHIPPED
Start: 2022-06-05 | End: 2022-06-12

## 2022-06-05 RX ORDER — BENZONATATE 100 MG/1
100 CAPSULE ORAL ONCE
Status: COMPLETED | OUTPATIENT
Start: 2022-06-05 | End: 2022-06-05

## 2022-06-05 RX ORDER — DOXYCYCLINE 100 MG/1
100 CAPSULE ORAL 2 TIMES DAILY
Qty: 20 CAPSULE | Refills: 0 | Status: SHIPPED
Start: 2022-06-05 | End: 2022-06-15

## 2022-06-05 RX ADMIN — ALBUTEROL SULFATE 2 PUFF: 90 AEROSOL, METERED RESPIRATORY (INHALATION) at 17:38

## 2022-06-05 RX ADMIN — BENZONATATE 100 MG: 100 CAPSULE ORAL at 17:38

## 2022-06-05 ASSESSMENT — LIFESTYLE VARIABLES
DOES PATIENT WANT TO STOP DRINKING: NO
DO YOU DRINK ALCOHOL: NO

## 2022-06-05 NOTE — ED TRIAGE NOTES
.  Chief Complaint   Patient presents with   • Shortness of Breath     2 home COVID test positive, S/S began 5/27   • Flu Like Symptoms     Cough. Body aches, tired, HA      Pt ambulate to triage with above complaints. Pt reports S/S began 5/27, notes she feels like her breathing is becoming more shallow, increased wheezing. Pt taking Tylenol, Ibuprofen, Dayquil at home with some relief.    Pt educated on triage process and returned to [facility].

## 2022-06-06 NOTE — ED NOTES
Pt is discharged. Paperwork explained and all questions answered. Prescription  education provided. All belongings sent with pt upon departure. Pt ambulatory with a steady gait out of ED.

## 2022-06-06 NOTE — ED PROVIDER NOTES
ED Provider Note    Scribed for Elizabeth Ashley M.D. by Vera Jones. 6/5/2022  5:06 PM    Primary care provider: Pcp Pt States None  Means of arrival: Walk in  History obtained from: Patient  History limited by: None    CHIEF COMPLAINT  Chief Complaint   Patient presents with   • Shortness of Breath     2 home COVID test positive, S/S began 5/27   • Flu Like Symptoms     Cough. Body aches, tired, OW[place]       HPI  Janine Capellan is a 51 y.o. female who presents to the ER complaining of worsening cough and chest congestion/tightness which began on May 27.  On the 27th she developed cough and myalgias.  She tested positive for COVID-19 on a home test on May 30 and again on June 1.  She has a history of asthma.  She has been taking her albuterol inhaler without any improvement in her cough and chest congestion.  She describes diffuse fatigue and weakness as well as continued myalgias.  She has subjective fever but she does not have a thermometer so she has not taken her temperature.  No vomiting or diarrhea.  No chest pain.  Yesterday she coughed up some phlegm with small amounts of blood in it.  No pain or swelling in her legs.  No history of DVT or PE.  She is not vaccinated for COVID.  She did not get her flu shot this year.    REVIEW OF SYSTEMS  Pertinent positives include cough, chest congestion, fatigue, weakness, myalgias, subjective fever.   Pertinent negatives include no vomiting, diarrhea, chest pain, leg swelling/pain.   See HPI for further details. All other systems are negative.    PAST MEDICAL HISTORY  Past Medical History:   Diagnosis Date   • GERD (gastroesophageal reflux disease)    Schizophrenia   asthma      FAMILY HISTORY  History reviewed. No pertinent family history.    SOCIAL HISTORY  Social History     Tobacco Use   • Smoking status: Current Some Day Smoker     Packs/day: 0.25   • Smokeless tobacco: Never Used   Vaping Use   • Vaping Use: Every day   • Substances: Nicotine   Substance Use Topics  "  • Alcohol use: Yes     Comment: occational    • Drug use: No      Social History     Substance and Sexual Activity   Drug Use No       SURGICAL HISTORY  Past Surgical History:   Procedure Laterality Date   • HYSTERECTOMY LAPAROSCOPY     • OTHER ORTHOPEDIC SURGERY      right femur, knee       CURRENT MEDICATIONS  Home Medications     Reviewed by Kim Mahoney R.N. (Registered Nurse) on 06/05/22 at 1510  Med List Status: Partial   Medication Last Dose Status   Hydrocodone-Acetaminophen (NORCO PO)  Active   HYDROXYZINE PAMOATE PO  Active   OMEPRAZOLE PO  Active   quetiapine (SEROQUEL) 100 MG TABS  Active   quetiapine (SEROQUEL) 25 MG TABS  Active   SERTRALINE HCL PO  Active   vitamin D 2000 UNIT TABS  Active                ALLERGIES  No Known Allergies    PHYSICAL EXAM  VITAL SIGNS: BP (!) 145/94   Pulse 86   Temp 36.4 °C (97.6 °F) (Temporal)   Resp 16   Ht 1.651 m (5' 5\")   Wt 124 kg (274 lb 7.6 oz)   LMP 10/24/2009   SpO2 95%   BMI 45.67 kg/m²      Constitutional: Well developed, well nourished; elevated BMI.  Frequent coughing during the exam.  Cough sounds bronchospastic and dry.  HENT: Normocephalic, atraumatic; Bilateral external ears normal; oropharyngeal examination deferred due to COVID-19 outbreak and lack of oropharyngeal complaint.  Eyes: PERRL, EOMI, Conjunctiva normal. No discharge.   Neck:  Supple, nontender midline; No stridor; No nuchal rigidity.   Lymphatic: No cervical lymphadenopathy noted.   Cardiovascular: Regular rate and rhythm without murmurs, rubs, or gallop.   Thorax & Lungs: Bronchospastic cough with decreased breath sounds throughout. Faint expiratory wheeze. No respiratory distress, No rales or rhonchi. Nontender chest wall. No crepitus or subcutaneous air  Abdomen: Soft, nontender, bowel sounds normal. No obvious masses; No pulsatile masses; no rebound, guarding, or peritoneal signs.   Skin: Good color; warm and dry without rash or petechia.  Back: Nontender, No CVA " tenderness.   Extremities: Distal radial, dorsalis pedis, posterior tibial pulses are equal bilaterally; No edema; Nontender calves or saphenous, No cyanosis, No clubbing.   Musculoskeletal: Good range of motion in all major joints. No tenderness to palpation or major deformities noted.   Neurologic: Alert & oriented x 4, clear speech.     EKG  12 Lead EKG interpreted by me as shown below.    LABS/RADIOLOGY/PROCEDURES  Results for orders placed or performed during the hospital encounter of 06/05/22   CoV-2, FLU A/B, and RSV by PCR (2-4 Hours [place]) : Collect NP swab in VTM    Specimen: Respirate   Result Value Ref Range    SARS-CoV-2 Source NP Swab    CBC WITH DIFFERENTIAL   Result Value Ref Range    WBC 10.7 4.8 - 10.8 K/uL    RBC 5.10 4.20 - 5.40 M/uL    Hemoglobin 12.5 12.0 - 16.0 g/dL    Hematocrit 40.4 37.0 - 47.0 %    MCV 79.2 (L) 81.4 - 97.8 fL    MCH 24.5 (L) 27.0 - 33.0 pg    MCHC 30.9 (L) 33.6 - 35.0 g/dL    RDW 52.3 (H) 35.9 - 50.0 fL    Platelet Count 338 164 - 446 K/uL    MPV 9.0 9.0 - 12.9 fL    Neutrophils-Polys 48.30 44.00 - 72.00 %    Lymphocytes 42.10 (H) 22.00 - 41.00 %    Monocytes 7.40 0.00 - 13.40 %    Eosinophils 1.40 0.00 - 6.90 %    Basophils 0.30 0.00 - 1.80 %    Immature Granulocytes 0.50 0.00 - 0.90 %    Nucleated RBC 0.00 /100 WBC    Neutrophils (Absolute) 5.17 2.00 - 7.15 K/uL    Lymphs (Absolute) 4.50 1.00 - 4.80 K/uL    Monos (Absolute) 0.79 0.00 - 0.85 K/uL    Eos (Absolute) 0.15 0.00 - 0.51 K/uL    Baso (Absolute) 0.03 0.00 - 0.12 K/uL    Immature Granulocytes (abs) 0.05 0.00 - 0.11 K/uL    NRBC (Absolute) 0.00 K/uL   COMP METABOLIC PANEL   Result Value Ref Range    Sodium 139 135 - 145 mmol/L    Potassium 4.1 3.6 - 5.5 mmol/L    Chloride 101 96 - 112 mmol/L    Co2 27 20 - 33 mmol/L    Anion Gap 11.0 7.0 - 16.0    Glucose 90 65 - 99 mg/dL    Bun 10 8 - 22 mg/dL    Creatinine 0.55 0.50 - 1.40 mg/dL    Calcium 9.3 8.5 - 10.5 mg/dL    AST(SGOT) 13 12 - 45 U/L    ALT(SGPT) 16 2 - 50  U/L    Alkaline Phosphatase 95 30 - 99 U/L    Total Bilirubin 0.2 0.1 - 1.5 mg/dL    Albumin 4.1 3.2 - 4.9 g/dL    Total Protein 7.4 6.0 - 8.2 g/dL    Globulin 3.3 1.9 - 3.5 g/dL    A-G Ratio 1.2 g/dL   D-DIMER   Result Value Ref Range    D-Dimer Screen 0.49 0.00 - 0.50 ug/mL (FEU)   TROPONIN   Result Value Ref Range    Troponin T <6 6 - 19 ng/L   ESTIMATED GFR   Result Value Ref Range    GFR (CKD-EPI) 111 >60 mL/min/1.73 m 2   EKG   Result Value Ref Range    Report       [facility] Emergency Dept.    Test Date:  2022  Pt Name:    WILNER WARNER                  Department: ER  MRN:        [id number]                      Room:  Gender:     Female                       Technician: [id number]  :        1970                   Requested By:ER TRIAGE PROTOCOL  Order #:    [id number]                    Reading MD: Elizabeth Ashley    Measurements  Intervals                                Axis  Rate:       82                           P:          50  WA:         140                          QRS:        15  QRSD:       98                           T:          55  QT:         386  QTc:        451    Interpretive Statements  Sinus rhythm rate 82  Normal axis  Normal intervals  T waves flat V2  NO ST elevation or depression  RSR' in V1 or V2, right VCD or RVH  Compared to ECG 2015 23:02:15  Right ventricular hypertrophy now present  RSR' in V1 or V2 now present  Sinus tachycardia no longer present  Myocardial infarct finding no longer presen t  Electronically Signed On 2022 18:21:15 PDT by Elizabeth Ashley       All labs reviewed by me.    DX-CHEST-PORTABLE (1 VIEW)   Final Result      Minimal peripheral interstitial prominence in the right upper lobe consistent with Covid 19 pneumonia.        The radiologist's interpretations of all radiological studies have been reviewed by me.        COURSE & MEDICAL DECISION MAKING  Pertinent Labs & Imaging studies reviewed. (See chart for details)    5:06 PM - Patient  seen and examined at bedside. Discussed plan of care, including obtaining lab and imaging to evaluate. Patient agrees to the plan of care. The patient will be medicated with benzonatate 100 mg capsule, and albuterol inhaler for her shortness of breath. Ordered for EKG, lab, and imaging to evaluate her symptoms.     6:50 PM - I spoke with pharmacy who informed me the patient is not a good candidate for Paxlovid or monoclonal antibodies because she is 10 days out from her COVID diagnosis.     7:20 PM - Patient was reevaluated at bedside. Patient had a few desaturations to 88-89%, but her oxygen level was mostly in the 90-93% range upon review of the monitor data in purple pod. She will undergo a road test to see if she desaturates with walking.  Patient verbalizes understanding and agreement to this plan of care.      7:57 PM - Patient's pulse ox was noted to be 92-94% on room air during ambulation.     8:30 PM - Patient was reevaluated at bedside. She has been maintaining oxygen saturation above 90% on room air post ambulation during another short period of observation. Discussed lab and radiology results with the patient as outlined above. The patient will return for new or worsening symptoms and is stable at the time of discharge. She will be prescribed medications for her symptoms. Patient verbalizes understanding and agreement to this plan of care.       Patient presents to the ER complaining of worsening cough and chest congestion over the last 10 days.  Days ago she developed cough, myalgias, fatigue and subjective fever.  She tested positive for COVID-19 twice at home last week.  She does not feel like she is getting any better so she comes in the ER for evaluation.  O2 sats have been running in the low 90s on room air.  She had a couple very brief desaturations into the 88 to 89% range while she was lying on the gurney.  She says she snores at night and is possible she may have some sleep apnea.  Overall,  however, she spent the majority of her ED stay in the low 90s on room air.  She ambulated without any difficulty with O2 sats in the 92 to 94% range on room air.  Chest x-ray reveals a very subtle right upper lobe COVID-pneumonia.  White count is normal.  She complained of a tiny bit of blood in phlegm yesterday.  D-dimer is within normal limits.  Due to IV contrast shortage, I do not think we need to proceed with CT scan of the chest as normal D-dimer is reassuring at this time.  Troponin is undetectable.  At this time no evidence for myocarditis.  EKG is nonacute.  Patient was given albuterol inhaler puffs as well as some Tessalon.  She does have some baseline asthma.  She has a bronchospastic cough but is not particularly wheezy per se.  Unfortunately she is out of the window for any monoclonal antibody therapy and she is out of the window for Paxlovid.  Unfortunately she is not vaccinated, so that is likely why her symptoms have been persistent and why she has developed a little bit of infiltrate in the right upper lobe.  She will go home with prescription for albuterol, Tessalon, and I will cover her with some doxycycline.  She has been asked to get a portable pulse oximeter and keep an eye on her oxygen saturations.  Her oxygen saturations drop below 90% consistently she needs to come to ER for reevaluation and likely admission for oxygen therapy.  However, at this time I think patient is safe and stable for outpatient management discharge home.  She is not in any respiratory distress.  She has been given very strict return precautions and discharge instructions for COVID and for COVID-pneumonia and she understands treatment plan and follow-up.    DISPOSITION:  Patient will be discharged home in stable condition.    FOLLOW UP:  [facility] ([facility] - Primary Care  [address] [place] [id number]  [phone]  Schedule an appointment as soon as possible for a visit in 2 days  If symptoms  worsen return to ER      OUTPATIENT MEDICATIONS:  New Prescriptions    ALBUTEROL 108 (90 BASE) MCG/ACT AERO SOLN INHALATION AEROSOL    Inhale 2 Puffs every 6 hours as needed for Shortness of Breath.    BENZONATATE (TESSALON) 100 MG CAP    Take 1 Capsule by mouth 3 times a day as needed for Cough for up to 7 days.    DOXYCYCLINE (MONODOX) 100 MG CAPSULE    Take 1 Capsule by mouth 2 times a day for 10 days.     The patient is referred to a primary physician for blood pressure management, diabetic screening, and for all other preventative health concerns.    FINAL IMPRESSION  1. Pneumonia due to COVID-19 virus Acute   2. Lower respiratory tract infection Acute         Vera BUI (Scribe), am scribing for, and in the presence of, Elizabeth Ashley M.D..    Electronically signed by: Vera Jones (Scribe), 6/5/2022    Elizabeth BUI M.D. personally performed the services described in this documentation, as scribed by Vera Jones in my presence, and it is both accurate and complete.    This dictation has been created using voice recognition software. The accuracy of the dictation is limited by the abilities of the software. I expect there may be some errors of grammar and possibly content. I made every attempt to manually correct the errors within my dictation. However, errors related to voice recognition software may still exist and should be interpreted within the appropriate context.    C    The note accurately reflects work and decisions made by me.  Elizabeth Ashley M.D.  6/6/2022  12:31 AM

## 2022-06-06 NOTE — DISCHARGE INSTRUCTIONS
Return to the ER for any worsening cough, worsening chest congestion, difficulty breathing, increased coughing of blood, chest pain, dizziness or lightheadedness, persistent fevers over 101, headache, stiff neck, rash, vomiting, diarrhea, pain or swelling in your legs, or for any concerns.    Follow-up with your primary care physician within the next 1 to 2 days.  Please call for appointment.  If you do not have a primary care doctor, please follow-up at the [facility] clinic within the next 1 to 2 days.  Please call for appointment.    Drink plenty of fluids.    Take over-the-counter Tylenol ibuprofen for discomfort.

## 2022-10-04 ENCOUNTER — HOSPITAL ENCOUNTER (EMERGENCY)
Facility: MEDICAL CENTER | Age: 52
End: 2022-10-04
Attending: EMERGENCY MEDICINE
Payer: COMMERCIAL

## 2022-10-04 ENCOUNTER — APPOINTMENT (OUTPATIENT)
Dept: RADIOLOGY | Facility: MEDICAL CENTER | Age: 52
End: 2022-10-04
Attending: EMERGENCY MEDICINE
Payer: COMMERCIAL

## 2022-10-04 VITALS
SYSTOLIC BLOOD PRESSURE: 135 MMHG | OXYGEN SATURATION: 93 % | HEART RATE: 90 BPM | WEIGHT: 286.38 LBS | BODY MASS INDEX: 47.71 KG/M2 | TEMPERATURE: 98.2 F | HEIGHT: 65 IN | RESPIRATION RATE: 17 BRPM | DIASTOLIC BLOOD PRESSURE: 62 MMHG

## 2022-10-04 DIAGNOSIS — R10.9 FLANK PAIN: ICD-10-CM

## 2022-10-04 DIAGNOSIS — R11.2 NAUSEA AND VOMITING, UNSPECIFIED VOMITING TYPE: ICD-10-CM

## 2022-10-04 DIAGNOSIS — N39.0 ACUTE UTI: Primary | ICD-10-CM

## 2022-10-04 LAB
ALBUMIN SERPL BCP-MCNC: 4.1 G/DL (ref 3.2–4.9)
ALBUMIN/GLOB SERPL: 1.6 G/DL
ALP SERPL-CCNC: 72 U/L (ref 30–99)
ALT SERPL-CCNC: 14 U/L (ref 2–50)
ANION GAP SERPL CALC-SCNC: 16 MMOL/L (ref 7–16)
APPEARANCE UR: ABNORMAL
AST SERPL-CCNC: 17 U/L (ref 12–45)
BACTERIA #/AREA URNS HPF: ABNORMAL /HPF
BASOPHILS # BLD AUTO: 0.5 % (ref 0–1.8)
BASOPHILS # BLD: 0.09 K/UL (ref 0–0.12)
BILIRUB SERPL-MCNC: 0.3 MG/DL (ref 0.1–1.5)
BILIRUB UR QL STRIP.AUTO: NEGATIVE
BUN SERPL-MCNC: 8 MG/DL (ref 8–22)
CALCIUM SERPL-MCNC: 8.8 MG/DL (ref 8.5–10.5)
CHLORIDE SERPL-SCNC: 101 MMOL/L (ref 96–112)
CO2 SERPL-SCNC: 24 MMOL/L (ref 20–33)
COLOR UR: YELLOW
CREAT SERPL-MCNC: 0.67 MG/DL (ref 0.5–1.4)
EOSINOPHIL # BLD AUTO: 0.19 K/UL (ref 0–0.51)
EOSINOPHIL NFR BLD: 1 % (ref 0–6.9)
EPI CELLS #/AREA URNS HPF: NEGATIVE /HPF
ERYTHROCYTE [DISTWIDTH] IN BLOOD BY AUTOMATED COUNT: 45 FL (ref 35.9–50)
GFR SERPLBLD CREATININE-BSD FMLA CKD-EPI: 106 ML/MIN/1.73 M 2
GLOBULIN SER CALC-MCNC: 2.6 G/DL (ref 1.9–3.5)
GLUCOSE SERPL-MCNC: 113 MG/DL (ref 65–99)
GLUCOSE UR STRIP.AUTO-MCNC: NEGATIVE MG/DL
HCT VFR BLD AUTO: 46.4 % (ref 37–47)
HGB BLD-MCNC: 15 G/DL (ref 12–16)
HYALINE CASTS #/AREA URNS LPF: ABNORMAL /LPF
IMM GRANULOCYTES # BLD AUTO: 0.1 K/UL (ref 0–0.11)
IMM GRANULOCYTES NFR BLD AUTO: 0.5 % (ref 0–0.9)
KETONES UR STRIP.AUTO-MCNC: NEGATIVE MG/DL
LACTATE SERPL-SCNC: 1.8 MMOL/L (ref 0.5–2)
LEUKOCYTE ESTERASE UR QL STRIP.AUTO: ABNORMAL
LIPASE SERPL-CCNC: 25 U/L (ref 11–82)
LYMPHOCYTES # BLD AUTO: 3.29 K/UL (ref 1–4.8)
LYMPHOCYTES NFR BLD: 17.3 % (ref 22–41)
MCH RBC QN AUTO: 28.2 PG (ref 27–33)
MCHC RBC AUTO-ENTMCNC: 32.3 G/DL (ref 33.6–35)
MCV RBC AUTO: 87.2 FL (ref 81.4–97.8)
MICRO URNS: ABNORMAL
MONOCYTES # BLD AUTO: 1.51 K/UL (ref 0–0.85)
MONOCYTES NFR BLD AUTO: 7.9 % (ref 0–13.4)
NEUTROPHILS # BLD AUTO: 13.83 K/UL (ref 2–7.15)
NEUTROPHILS NFR BLD: 72.8 % (ref 44–72)
NITRITE UR QL STRIP.AUTO: POSITIVE
NRBC # BLD AUTO: 0 K/UL
NRBC BLD-RTO: 0 /100 WBC
PH UR STRIP.AUTO: 5.5 [PH] (ref 5–8)
PLATELET # BLD AUTO: 397 K/UL (ref 164–446)
PMV BLD AUTO: 8.8 FL (ref 9–12.9)
POTASSIUM SERPL-SCNC: 3.2 MMOL/L (ref 3.6–5.5)
PROT SERPL-MCNC: 6.7 G/DL (ref 6–8.2)
PROT UR QL STRIP: 300 MG/DL
RBC # BLD AUTO: 5.32 M/UL (ref 4.2–5.4)
RBC # URNS HPF: >150 /HPF
RBC UR QL AUTO: ABNORMAL
SODIUM SERPL-SCNC: 141 MMOL/L (ref 135–145)
SP GR UR STRIP.AUTO: 1.02
UROBILINOGEN UR STRIP.AUTO-MCNC: 1 MG/DL
WBC # BLD AUTO: 19 K/UL (ref 4.8–10.8)
WBC #/AREA URNS HPF: ABNORMAL /HPF

## 2022-10-04 PROCEDURE — 85025 COMPLETE CBC W/AUTO DIFF WBC: CPT

## 2022-10-04 PROCEDURE — 96374 THER/PROPH/DIAG INJ IV PUSH: CPT

## 2022-10-04 PROCEDURE — 83690 ASSAY OF LIPASE: CPT

## 2022-10-04 PROCEDURE — 83605 ASSAY OF LACTIC ACID: CPT

## 2022-10-04 PROCEDURE — 74176 CT ABD & PELVIS W/O CONTRAST: CPT

## 2022-10-04 PROCEDURE — 96375 TX/PRO/DX INJ NEW DRUG ADDON: CPT

## 2022-10-04 PROCEDURE — 80053 COMPREHEN METABOLIC PANEL: CPT

## 2022-10-04 PROCEDURE — 81001 URINALYSIS AUTO W/SCOPE: CPT

## 2022-10-04 PROCEDURE — 700105 HCHG RX REV CODE 258: Performed by: EMERGENCY MEDICINE

## 2022-10-04 PROCEDURE — 700111 HCHG RX REV CODE 636 W/ 250 OVERRIDE (IP): Performed by: EMERGENCY MEDICINE

## 2022-10-04 PROCEDURE — 36415 COLL VENOUS BLD VENIPUNCTURE: CPT

## 2022-10-04 PROCEDURE — 99285 EMERGENCY DEPT VISIT HI MDM: CPT

## 2022-10-04 RX ORDER — CEFTRIAXONE 2 G/1
2 INJECTION, POWDER, FOR SOLUTION INTRAMUSCULAR; INTRAVENOUS ONCE
Status: COMPLETED | OUTPATIENT
Start: 2022-10-04 | End: 2022-10-04

## 2022-10-04 RX ORDER — SULFAMETHOXAZOLE AND TRIMETHOPRIM 800; 160 MG/1; MG/1
1 TABLET ORAL 2 TIMES DAILY
Qty: 20 TABLET | Refills: 0 | Status: SHIPPED | OUTPATIENT
Start: 2022-10-04 | End: 2022-10-14

## 2022-10-04 RX ORDER — ONDANSETRON 4 MG/1
4 TABLET, ORALLY DISINTEGRATING ORAL EVERY 6 HOURS PRN
Qty: 10 TABLET | Refills: 0 | Status: SHIPPED | OUTPATIENT
Start: 2022-10-04 | End: 2023-07-02

## 2022-10-04 RX ORDER — KETOROLAC TROMETHAMINE 30 MG/ML
30 INJECTION, SOLUTION INTRAMUSCULAR; INTRAVENOUS ONCE
Status: COMPLETED | OUTPATIENT
Start: 2022-10-04 | End: 2022-10-04

## 2022-10-04 RX ORDER — ONDANSETRON 2 MG/ML
4 INJECTION INTRAMUSCULAR; INTRAVENOUS ONCE
Status: COMPLETED | OUTPATIENT
Start: 2022-10-04 | End: 2022-10-04

## 2022-10-04 RX ORDER — IBUPROFEN 800 MG/1
800 TABLET ORAL EVERY 8 HOURS PRN
Qty: 20 TABLET | Refills: 0 | Status: SHIPPED | OUTPATIENT
Start: 2022-10-04 | End: 2022-10-07

## 2022-10-04 RX ORDER — ACETAMINOPHEN 500 MG
1000 TABLET ORAL EVERY 6 HOURS PRN
Qty: 20 TABLET | Refills: 0 | Status: SHIPPED | OUTPATIENT
Start: 2022-10-04 | End: 2022-10-08

## 2022-10-04 RX ORDER — SODIUM CHLORIDE 9 MG/ML
1000 INJECTION, SOLUTION INTRAVENOUS ONCE
Status: COMPLETED | OUTPATIENT
Start: 2022-10-04 | End: 2022-10-04

## 2022-10-04 RX ADMIN — SODIUM CHLORIDE 1000 ML: 9 INJECTION, SOLUTION INTRAVENOUS at 05:31

## 2022-10-04 RX ADMIN — ONDANSETRON 4 MG: 2 INJECTION INTRAMUSCULAR; INTRAVENOUS at 05:31

## 2022-10-04 RX ADMIN — KETOROLAC TROMETHAMINE 30 MG: 30 INJECTION, SOLUTION INTRAMUSCULAR at 05:31

## 2022-10-04 RX ADMIN — CEFTRIAXONE SODIUM 2 G: 2 INJECTION, POWDER, FOR SOLUTION INTRAMUSCULAR; INTRAVENOUS at 06:01

## 2022-10-04 ASSESSMENT — LIFESTYLE VARIABLES: DO YOU DRINK ALCOHOL: NO

## 2022-10-04 ASSESSMENT — FIBROSIS 4 INDEX: FIB4 SCORE: 0.49

## 2022-10-04 NOTE — ED TRIAGE NOTES
"  Chief Complaint   Patient presents with    Flank Pain    Abdominal Pain    N/V     Pt ambulatory to triage with complaint of right flank pain, lower abd pain w/ N/V, dysuria and urinary frequency. Pt states \"I know I have a UTI.\" Pt dates urinary symptoms x1 week, and flank pain and N/V vomiting starting last night. Denies blood in urine. Provided UA cup and instructions, protocol ordered.      .BP (!) 180/109   Pulse 86   Temp 36.6 °C (97.9 °F) (Temporal)   Resp 18   Ht 1.651 m (5' 5\")   Wt (!) 130 kg (286 lb 6 oz)   LMP 10/24/2009   SpO2 95%   BMI 47.66 kg/m²     "

## 2022-10-04 NOTE — ED PROVIDER NOTES
ED Provider Note  ED Provider Note    Scribed for Scotty Strickland by Scotty Strickland. 10/4/2022  5:04 AM    Primary care provider: Pcp Pt States None  Means of arrival: Private vehicle  History obtained from: Patient  History limited by: None    CHIEF COMPLAINT  Chief Complaint   Patient presents with    Flank Pain    Abdominal Pain    N/V     HPI  Janine Capellan is a 52 y.o. female who presents to the Emergency Department with a week and a half of UTI symptoms including dysuria, increased urinary frequency but denies hematuria.  Over the last 12 hours she developed severe right-sided flank pain, nausea, vomiting.  Denies fevers, no vaginal discharge, no diarrhea.  No significant abdominal pain and pain is localized and aching in nature to the right flank.  No history of kidney stones.  Had a UTI over 20 years ago and states that this she thinks this feels similar.  No drugs, alcohol or tobacco abuse.  Takes no medications.  No recent travel.  No recent sexual intercourse or STD exposure.  Quality: Burning with urination  Duration: 9 days  Severity: Moderate  Associated sx: Nausea, vomiting, flank pain    REVIEW OF SYSTEMS  As above, all other systems reviewed and are negative.   See HPI for further details.     PAST MEDICAL HISTORY   has a past medical history of Asthma, GERD (gastroesophageal reflux disease), and Psychiatric disorder.  SURGICAL HISTORY   has a past surgical history that includes hysterectomy laparoscopy and other orthopedic surgery.  SOCIAL HISTORY  Social History     Tobacco Use    Smoking status: Former     Packs/day: 0.25     Types: Cigarettes    Smokeless tobacco: Never   Vaping Use    Vaping Use: Every day    Substances: Nicotine   Substance Use Topics    Alcohol use: Not Currently    Drug use: No      Social History     Substance and Sexual Activity   Drug Use No     FAMILY HISTORY  History reviewed. No pertinent family history.  CURRENT MEDICATIONS  Home Medications       Reviewed  "by Amanda Wilder R.N. (Registered Nurse) on 10/04/22 at 0444  Med List Status: Not Addressed     Medication Last Dose Status   albuterol 108 (90 Base) MCG/ACT Aero Soln inhalation aerosol  Active   Hydrocodone-Acetaminophen (NORCO PO)  Active   HYDROXYZINE PAMOATE PO  Active   OMEPRAZOLE PO  Active   quetiapine (SEROQUEL) 100 MG TABS  Active   quetiapine (SEROQUEL) 25 MG TABS  Active   SERTRALINE HCL PO  Active   vitamin D 2000 UNIT TABS  Active                  ALLERGIES  No Known Allergies    PHYSICAL EXAM    VITAL SIGNS:   Vitals:    10/04/22 0436 10/04/22 0442   BP: (!) 180/109    Pulse: 86    Resp: 18    Temp: 36.6 °C (97.9 °F)    TempSrc: Temporal    SpO2: 95%    Weight:  (!) 130 kg (286 lb 6 oz)   Height:  1.651 m (5' 5\")     Vitals: My interpretation: Hypertensive, not tachycardic, afebrile, not hypoxic    Reinterpretation of vitals: Improved    PE:   Constitutional: Appears acutely uncomfortable clutching her right flank  HENT: Normocephalic, Atraumatic, Bilateral external ears normal, Oropharynx is clear mucous membranes are moist. No oral exudates or nasal discharge.   Eyes: Pupils are equal round and reactive, EOMI, Conjunctiva normal, No discharge.   Neck: Normal range of motion, No tenderness, Supple, No stridor. No meningismus.  Lymphatic: No lymphadenopathy noted.   Cardiovascular: Regular rate and rhythm without murmur rub or gallop.  Thorax & Lungs: Clear breath sounds bilaterally without wheezes, rhonchi or rales. There is no chest wall tenderness.   Abdomen: Soft non-tender non-distended.  No significant reproducible right flank pain.  There is no rebound or guarding. No organomegaly is appreciated. Bowel sounds are normal.  Skin: Normal without rash.   Back: No CVA or spinal tenderness.   Extremities: Intact distal pulses, No edema, No tenderness, No cyanosis, No clubbing. Capillary refill is less than 2 seconds.  Musculoskeletal: Good range of motion in all major joints. No tenderness to " palpation or major deformities noted.   Neurologic: Alert & oriented x 3, Normal motor function, Normal sensory function, No focal deficits noted. Reflexes are normal.  Psychiatric: Affect normal, Judgment normal, Mood normal. There is no suicidal ideation or patient reported hallucinations.     DIAGNOSTIC STUDIES / PROCEDURES    LABS  Results for orders placed or performed during the hospital encounter of 10/04/22   CBC with Differential   Result Value Ref Range    WBC 19.0 (H) 4.8 - 10.8 K/uL    RBC 5.32 4.20 - 5.40 M/uL    Hemoglobin 15.0 12.0 - 16.0 g/dL    Hematocrit 46.4 37.0 - 47.0 %    MCV 87.2 81.4 - 97.8 fL    MCH 28.2 27.0 - 33.0 pg    MCHC 32.3 (L) 33.6 - 35.0 g/dL    RDW 45.0 35.9 - 50.0 fL    Platelet Count 397 164 - 446 K/uL    MPV 8.8 (L) 9.0 - 12.9 fL    Neutrophils-Polys 72.80 (H) 44.00 - 72.00 %    Lymphocytes 17.30 (L) 22.00 - 41.00 %    Monocytes 7.90 0.00 - 13.40 %    Eosinophils 1.00 0.00 - 6.90 %    Basophils 0.50 0.00 - 1.80 %    Immature Granulocytes 0.50 0.00 - 0.90 %    Nucleated RBC 0.00 /100 WBC    Neutrophils (Absolute) 13.83 (H) 2.00 - 7.15 K/uL    Lymphs (Absolute) 3.29 1.00 - 4.80 K/uL    Monos (Absolute) 1.51 (H) 0.00 - 0.85 K/uL    Eos (Absolute) 0.19 0.00 - 0.51 K/uL    Baso (Absolute) 0.09 0.00 - 0.12 K/uL    Immature Granulocytes (abs) 0.10 0.00 - 0.11 K/uL    NRBC (Absolute) 0.00 K/uL   Complete Metabolic Panel   Result Value Ref Range    Sodium 141 135 - 145 mmol/L    Potassium 3.2 (L) 3.6 - 5.5 mmol/L    Chloride 101 96 - 112 mmol/L    Co2 24 20 - 33 mmol/L    Anion Gap 16.0 7.0 - 16.0    Glucose 113 (H) 65 - 99 mg/dL    Bun 8 8 - 22 mg/dL    Creatinine 0.67 0.50 - 1.40 mg/dL    Calcium 8.8 8.5 - 10.5 mg/dL    AST(SGOT) 17 12 - 45 U/L    ALT(SGPT) 14 2 - 50 U/L    Alkaline Phosphatase 72 30 - 99 U/L    Total Bilirubin 0.3 0.1 - 1.5 mg/dL    Albumin 4.1 3.2 - 4.9 g/dL    Total Protein 6.7 6.0 - 8.2 g/dL    Globulin 2.6 1.9 - 3.5 g/dL    A-G Ratio 1.6 g/dL   Lipase   Result  Value Ref Range    Lipase 25 11 - 82 U/L   Urinalysis    Specimen: Urine   Result Value Ref Range    Color Yellow     Character Turbid (A)     Specific Gravity 1.018 <1.035    Ph 5.5 5.0 - 8.0    Glucose Negative Negative mg/dL    Ketones Negative Negative mg/dL    Protein 300 (A) Negative mg/dL    Bilirubin Negative Negative    Urobilinogen, Urine 1.0 Negative    Nitrite Positive (A) Negative    Leukocyte Esterase Moderate (A) Negative    Occult Blood Large (A) Negative    Micro Urine Req Microscopic    URINE MICROSCOPIC (W/UA)   Result Value Ref Range    WBC Packed (A) /hpf    RBC >150 (A) /hpf    Bacteria Many (A) None /hpf    Epithelial Cells Negative /hpf    Hyaline Cast 0-2 /lpf   LACTIC ACID   Result Value Ref Range    Lactic Acid 1.8 0.5 - 2.0 mmol/L   ESTIMATED GFR   Result Value Ref Range    GFR (CKD-EPI) 106 >60 mL/min/1.73 m 2      All labs reviewed by me. Significant for leukocytosis of 19,000, no anemia, normal electrolytes other than mild hypokalemia, normal renal function, normal liver enzymes, normal bilirubin, lipase negative, urinalysis positive for infection, lactic acid normal    RADIOLOGY  CT-RENAL COLIC EVALUATION(A/P W/O)   Final Result         1.  No urinary tract stones identified. Slight hazy right perinephric fat stranding and minimal right prominence of renal pelvis, could represent changes related to recently passed stone or urinary tract infection.   2.  Fat-containing umbilical hernia   3.  Hepatomegaly   4.  Atherosclerosis        The radiologist's interpretation of all radiological studies have been reviewed by me.    COURSE & MEDICAL DECISION MAKING  Nursing notes, VS, PMSFHx, labs, imaging, EKG reviewed in chart.    MDM: 5:04 AM Janine Capellan is a 52 y.o. female who presented with signs symptoms concerning for acute UTI with dysuria, increased urinary frequency and pelvic pain for the last week and a half, developed right-sided flank pain, nausea and vomiting over the past 12  hours.  On arrival she appears acutely uncomfortable, is hypertensive like secondary pain but afebrile vital signs are otherwise unremarkable.  Started on Zofran, Toradol and sent for CT imaging, laboratory evaluation and urinalysis to evaluate for kidney stone versus UTI.  Labs show leukocytosis but otherwise unremarkable renal function, lactic acid, urinalysis obvious for urinary tract infection.  CT imaging shows likely urinary tract infection but no kidney stone seen.  Patient feels improvement in vital signs improved after treatment with nausea and pain medication.  Started on antibiotics here in the emergency department Rx for the same.  Bactrim prescribed for pyelonephritis, Rx for 10 days.  Offered the patient admission but this time she feels comfortable going home, is stable, with normal vital signs and is tolerating oral intake and has good return precautions in place and understands return precautions and is able to verbalize this.  Patient will follow up with PCP for further evaluation treatment and verbalized understanding strict return precautions outpatient follow-up plan.    FINAL IMPRESSION  1. Acute UTI Acute   2. Nausea and vomiting, unspecified vomiting type Acute   3. Flank pain Acute      The note accurately reflects work and decisions made by me.  Scotty Strickland  10/4/2022  5:04 AM

## 2022-10-04 NOTE — ED NOTES
[facility] Discharge instructions given and pt has no follow up questions. Vitals and condition stable for discharge

## 2022-10-04 NOTE — DISCHARGE INSTRUCTIONS
I want you to take 1000 mg of Tylenol 3 times a day.  Take 800 mg of Motrin 3 times a day.  Take the Zofran as needed for nausea and vomiting.  Stay as well hydrated as you can and drink plenty of fluids.  Take the Bactrim antibiotics for the next 10 days to help with your urinary tract infection.  Follow-up with your PCP as needed.  As I discussed with you I want to return if your symptoms worsen acutely.  Thank you for coming in today.

## 2023-07-02 ENCOUNTER — HOSPITAL ENCOUNTER (INPATIENT)
Facility: MEDICAL CENTER | Age: 53
LOS: 2 days | DRG: 871 | End: 2023-07-04
Attending: EMERGENCY MEDICINE | Admitting: HOSPITALIST
Payer: COMMERCIAL

## 2023-07-02 ENCOUNTER — APPOINTMENT (OUTPATIENT)
Dept: RADIOLOGY | Facility: MEDICAL CENTER | Age: 53
DRG: 871 | End: 2023-07-02
Attending: EMERGENCY MEDICINE
Payer: COMMERCIAL

## 2023-07-02 DIAGNOSIS — J96.01 ACUTE RESPIRATORY FAILURE WITH HYPOXIA (HCC): ICD-10-CM

## 2023-07-02 DIAGNOSIS — G47.00 INSOMNIA, UNSPECIFIED TYPE: ICD-10-CM

## 2023-07-02 DIAGNOSIS — J18.9 COMMUNITY ACQUIRED PNEUMONIA OF RIGHT MIDDLE LOBE OF LUNG: ICD-10-CM

## 2023-07-02 PROBLEM — E66.813 CLASS 3 SEVERE OBESITY DUE TO EXCESS CALORIES WITHOUT SERIOUS COMORBIDITY WITH BODY MASS INDEX (BMI) OF 45.0 TO 49.9 IN ADULT (HCC): Status: ACTIVE | Noted: 2023-07-02

## 2023-07-02 PROBLEM — A41.9 SEPSIS (HCC): Status: ACTIVE | Noted: 2023-07-02

## 2023-07-02 PROBLEM — J96.00 ACUTE RESPIRATORY FAILURE (HCC): Status: ACTIVE | Noted: 2023-07-02

## 2023-07-02 PROBLEM — E66.01 CLASS 3 SEVERE OBESITY DUE TO EXCESS CALORIES WITHOUT SERIOUS COMORBIDITY WITH BODY MASS INDEX (BMI) OF 45.0 TO 49.9 IN ADULT (HCC): Status: ACTIVE | Noted: 2023-07-02

## 2023-07-02 LAB
ALBUMIN SERPL BCP-MCNC: 3.8 G/DL (ref 3.2–4.9)
ALBUMIN/GLOB SERPL: 1.3 G/DL
ALP SERPL-CCNC: 106 U/L (ref 30–99)
ALT SERPL-CCNC: 28 U/L (ref 2–50)
ANION GAP SERPL CALC-SCNC: 15 MMOL/L (ref 7–16)
APPEARANCE UR: CLEAR
AST SERPL-CCNC: 18 U/L (ref 12–45)
BACTERIA #/AREA URNS HPF: NEGATIVE /HPF
BASOPHILS # BLD AUTO: 0.4 % (ref 0–1.8)
BASOPHILS # BLD: 0.09 K/UL (ref 0–0.12)
BILIRUB SERPL-MCNC: 0.6 MG/DL (ref 0.1–1.5)
BILIRUB UR QL STRIP.AUTO: NEGATIVE
BUN SERPL-MCNC: 5 MG/DL (ref 8–22)
CALCIUM ALBUM COR SERPL-MCNC: 9.4 MG/DL (ref 8.5–10.5)
CALCIUM SERPL-MCNC: 9.2 MG/DL (ref 8.5–10.5)
CHLORIDE SERPL-SCNC: 97 MMOL/L (ref 96–112)
CO2 SERPL-SCNC: 24 MMOL/L (ref 20–33)
COLOR UR: YELLOW
CREAT SERPL-MCNC: 0.44 MG/DL (ref 0.5–1.4)
EKG IMPRESSION: NORMAL
EOSINOPHIL # BLD AUTO: 0.01 K/UL (ref 0–0.51)
EOSINOPHIL NFR BLD: 0 % (ref 0–6.9)
EPI CELLS #/AREA URNS HPF: NEGATIVE /HPF
ERYTHROCYTE [DISTWIDTH] IN BLOOD BY AUTOMATED COUNT: 44.6 FL (ref 35.9–50)
FLUAV RNA SPEC QL NAA+PROBE: NEGATIVE
FLUBV RNA SPEC QL NAA+PROBE: NEGATIVE
GFR SERPLBLD CREATININE-BSD FMLA CKD-EPI: 116 ML/MIN/1.73 M 2
GLOBULIN SER CALC-MCNC: 3 G/DL (ref 1.9–3.5)
GLUCOSE SERPL-MCNC: 134 MG/DL (ref 65–99)
GLUCOSE UR STRIP.AUTO-MCNC: NEGATIVE MG/DL
HCT VFR BLD AUTO: 40.2 % (ref 37–47)
HGB BLD-MCNC: 12.9 G/DL (ref 12–16)
HYALINE CASTS #/AREA URNS LPF: ABNORMAL /LPF
IMM GRANULOCYTES # BLD AUTO: 0.2 K/UL (ref 0–0.11)
IMM GRANULOCYTES NFR BLD AUTO: 0.9 % (ref 0–0.9)
KETONES UR STRIP.AUTO-MCNC: ABNORMAL MG/DL
LACTATE SERPL-SCNC: 0.8 MMOL/L (ref 0.5–2)
LACTATE SERPL-SCNC: 2.6 MMOL/L (ref 0.5–2)
LEUKOCYTE ESTERASE UR QL STRIP.AUTO: NEGATIVE
LIPASE SERPL-CCNC: 12 U/L (ref 11–82)
LYMPHOCYTES # BLD AUTO: 1.94 K/UL (ref 1–4.8)
LYMPHOCYTES NFR BLD: 8.7 % (ref 22–41)
MCH RBC QN AUTO: 26.5 PG (ref 27–33)
MCHC RBC AUTO-ENTMCNC: 32.1 G/DL (ref 32.2–35.5)
MCV RBC AUTO: 82.5 FL (ref 81.4–97.8)
MICRO URNS: ABNORMAL
MONOCYTES # BLD AUTO: 1.42 K/UL (ref 0–0.85)
MONOCYTES NFR BLD AUTO: 6.4 % (ref 0–13.4)
NEUTROPHILS # BLD AUTO: 18.56 K/UL (ref 1.82–7.42)
NEUTROPHILS NFR BLD: 83.6 % (ref 44–72)
NITRITE UR QL STRIP.AUTO: NEGATIVE
NRBC # BLD AUTO: 0.02 K/UL
NRBC BLD-RTO: 0.1 /100 WBC (ref 0–0.2)
NT-PROBNP SERPL IA-MCNC: 1472 PG/ML (ref 0–125)
PH UR STRIP.AUTO: 6.5 [PH] (ref 5–8)
PLATELET # BLD AUTO: 309 K/UL (ref 164–446)
PMV BLD AUTO: 9.8 FL (ref 9–12.9)
POTASSIUM SERPL-SCNC: 3.9 MMOL/L (ref 3.6–5.5)
PROT SERPL-MCNC: 6.8 G/DL (ref 6–8.2)
PROT UR QL STRIP: 30 MG/DL
RBC # BLD AUTO: 4.87 M/UL (ref 4.2–5.4)
RBC # URNS HPF: ABNORMAL /HPF
RBC UR QL AUTO: ABNORMAL
RSV RNA SPEC QL NAA+PROBE: NEGATIVE
S PYO DNA SPEC NAA+PROBE: DETECTED
SARS-COV-2 RNA RESP QL NAA+PROBE: NOTDETECTED
SODIUM SERPL-SCNC: 136 MMOL/L (ref 135–145)
SP GR UR STRIP.AUTO: 1.01
SPECIMEN SOURCE: NORMAL
TROPONIN T SERPL-MCNC: 12 NG/L (ref 6–19)
UROBILINOGEN UR STRIP.AUTO-MCNC: 1 MG/DL
WBC # BLD AUTO: 22.2 K/UL (ref 4.8–10.8)
WBC #/AREA URNS HPF: ABNORMAL /HPF

## 2023-07-02 PROCEDURE — 99285 EMERGENCY DEPT VISIT HI MDM: CPT

## 2023-07-02 PROCEDURE — 93005 ELECTROCARDIOGRAM TRACING: CPT | Performed by: EMERGENCY MEDICINE

## 2023-07-02 PROCEDURE — 700105 HCHG RX REV CODE 258: Mod: JZ | Performed by: HOSPITALIST

## 2023-07-02 PROCEDURE — 700111 HCHG RX REV CODE 636 W/ 250 OVERRIDE (IP): Mod: JZ | Performed by: HOSPITALIST

## 2023-07-02 PROCEDURE — C9803 HOPD COVID-19 SPEC COLLECT: HCPCS | Performed by: EMERGENCY MEDICINE

## 2023-07-02 PROCEDURE — 36415 COLL VENOUS BLD VENIPUNCTURE: CPT

## 2023-07-02 PROCEDURE — 87086 URINE CULTURE/COLONY COUNT: CPT

## 2023-07-02 PROCEDURE — A9270 NON-COVERED ITEM OR SERVICE: HCPCS | Performed by: EMERGENCY MEDICINE

## 2023-07-02 PROCEDURE — 80053 COMPREHEN METABOLIC PANEL: CPT

## 2023-07-02 PROCEDURE — 96365 THER/PROPH/DIAG IV INF INIT: CPT

## 2023-07-02 PROCEDURE — 81001 URINALYSIS AUTO W/SCOPE: CPT

## 2023-07-02 PROCEDURE — 700105 HCHG RX REV CODE 258: Mod: JZ | Performed by: EMERGENCY MEDICINE

## 2023-07-02 PROCEDURE — 700102 HCHG RX REV CODE 250 W/ 637 OVERRIDE(OP): Performed by: EMERGENCY MEDICINE

## 2023-07-02 PROCEDURE — 700111 HCHG RX REV CODE 636 W/ 250 OVERRIDE (IP): Performed by: EMERGENCY MEDICINE

## 2023-07-02 PROCEDURE — 99223 1ST HOSP IP/OBS HIGH 75: CPT | Performed by: HOSPITALIST

## 2023-07-02 PROCEDURE — 85025 COMPLETE CBC W/AUTO DIFF WBC: CPT

## 2023-07-02 PROCEDURE — 84484 ASSAY OF TROPONIN QUANT: CPT

## 2023-07-02 PROCEDURE — 83690 ASSAY OF LIPASE: CPT

## 2023-07-02 PROCEDURE — 770006 HCHG ROOM/CARE - MED/SURG/GYN SEMI*

## 2023-07-02 PROCEDURE — 96375 TX/PRO/DX INJ NEW DRUG ADDON: CPT

## 2023-07-02 PROCEDURE — 0241U HCHG SARS-COV-2 COVID-19 NFCT DS RESP RNA 4 TRGT MIC: CPT

## 2023-07-02 PROCEDURE — 700102 HCHG RX REV CODE 250 W/ 637 OVERRIDE(OP): Performed by: HOSPITALIST

## 2023-07-02 PROCEDURE — 83605 ASSAY OF LACTIC ACID: CPT | Mod: 91

## 2023-07-02 PROCEDURE — 87651 STREP A DNA AMP PROBE: CPT

## 2023-07-02 PROCEDURE — A9270 NON-COVERED ITEM OR SERVICE: HCPCS | Performed by: HOSPITALIST

## 2023-07-02 PROCEDURE — 71045 X-RAY EXAM CHEST 1 VIEW: CPT

## 2023-07-02 PROCEDURE — 87040 BLOOD CULTURE FOR BACTERIA: CPT | Mod: 91

## 2023-07-02 PROCEDURE — 83880 ASSAY OF NATRIURETIC PEPTIDE: CPT

## 2023-07-02 RX ORDER — AMOXICILLIN 250 MG
2 CAPSULE ORAL 2 TIMES DAILY
Status: DISCONTINUED | OUTPATIENT
Start: 2023-07-02 | End: 2023-07-04 | Stop reason: HOSPADM

## 2023-07-02 RX ORDER — MIRABEGRON 25 MG/1
25 TABLET, FILM COATED, EXTENDED RELEASE ORAL DAILY
COMMUNITY

## 2023-07-02 RX ORDER — TRAZODONE HYDROCHLORIDE 50 MG/1
100 TABLET ORAL
Status: DISCONTINUED | OUTPATIENT
Start: 2023-07-02 | End: 2023-07-04 | Stop reason: HOSPADM

## 2023-07-02 RX ORDER — TRAZODONE HYDROCHLORIDE 100 MG/1
100-200 TABLET ORAL
COMMUNITY

## 2023-07-02 RX ORDER — AZITHROMYCIN 250 MG/1
500 TABLET, FILM COATED ORAL ONCE
Status: COMPLETED | OUTPATIENT
Start: 2023-07-02 | End: 2023-07-02

## 2023-07-02 RX ORDER — CYCLOBENZAPRINE HCL 10 MG
10 TABLET ORAL 3 TIMES DAILY PRN
Status: DISCONTINUED | OUTPATIENT
Start: 2023-07-02 | End: 2023-07-04 | Stop reason: HOSPADM

## 2023-07-02 RX ORDER — ZOLPIDEM TARTRATE 5 MG/1
10 TABLET ORAL NIGHTLY PRN
Status: DISCONTINUED | OUTPATIENT
Start: 2023-07-02 | End: 2023-07-04 | Stop reason: HOSPADM

## 2023-07-02 RX ORDER — PROCHLORPERAZINE EDISYLATE 5 MG/ML
10 INJECTION INTRAMUSCULAR; INTRAVENOUS ONCE
Status: COMPLETED | OUTPATIENT
Start: 2023-07-02 | End: 2023-07-02

## 2023-07-02 RX ORDER — LUMATEPERONE 42 MG/1
42 CAPSULE ORAL
COMMUNITY

## 2023-07-02 RX ORDER — QUETIAPINE FUMARATE 100 MG/1
100 TABLET, FILM COATED ORAL EVERY EVENING
Status: DISCONTINUED | OUTPATIENT
Start: 2023-07-02 | End: 2023-07-02

## 2023-07-02 RX ORDER — PREGABALIN 75 MG/1
75 CAPSULE ORAL 2 TIMES DAILY
Status: DISCONTINUED | OUTPATIENT
Start: 2023-07-02 | End: 2023-07-04 | Stop reason: HOSPADM

## 2023-07-02 RX ORDER — SODIUM CHLORIDE, SODIUM LACTATE, POTASSIUM CHLORIDE, AND CALCIUM CHLORIDE .6; .31; .03; .02 G/100ML; G/100ML; G/100ML; G/100ML
30 INJECTION, SOLUTION INTRAVENOUS ONCE
Status: COMPLETED | OUTPATIENT
Start: 2023-07-02 | End: 2023-07-02

## 2023-07-02 RX ORDER — LISINOPRIL 10 MG/1
10 TABLET ORAL DAILY
Status: DISCONTINUED | OUTPATIENT
Start: 2023-07-02 | End: 2023-07-04 | Stop reason: HOSPADM

## 2023-07-02 RX ORDER — DIPHENHYDRAMINE HYDROCHLORIDE 50 MG/ML
25 INJECTION INTRAMUSCULAR; INTRAVENOUS ONCE
Status: COMPLETED | OUTPATIENT
Start: 2023-07-02 | End: 2023-07-02

## 2023-07-02 RX ORDER — HEPARIN SODIUM 5000 [USP'U]/ML
5000 INJECTION, SOLUTION INTRAVENOUS; SUBCUTANEOUS EVERY 8 HOURS
Status: DISCONTINUED | OUTPATIENT
Start: 2023-07-02 | End: 2023-07-03

## 2023-07-02 RX ORDER — PREGABALIN 75 MG/1
75 CAPSULE ORAL 2 TIMES DAILY
COMMUNITY

## 2023-07-02 RX ORDER — ACETAMINOPHEN 325 MG/1
650 TABLET ORAL EVERY 6 HOURS PRN
Status: DISCONTINUED | OUTPATIENT
Start: 2023-07-02 | End: 2023-07-04 | Stop reason: HOSPADM

## 2023-07-02 RX ORDER — ZOLPIDEM TARTRATE 10 MG/1
10 TABLET ORAL
Status: ON HOLD | COMMUNITY
End: 2023-07-04 | Stop reason: SDUPTHER

## 2023-07-02 RX ORDER — ACETAMINOPHEN 325 MG/1
650 TABLET ORAL ONCE
Status: COMPLETED | OUTPATIENT
Start: 2023-07-02 | End: 2023-07-02

## 2023-07-02 RX ORDER — POLYETHYLENE GLYCOL 3350 17 G/17G
1 POWDER, FOR SOLUTION ORAL
Status: DISCONTINUED | OUTPATIENT
Start: 2023-07-02 | End: 2023-07-04 | Stop reason: HOSPADM

## 2023-07-02 RX ORDER — LISINOPRIL 10 MG/1
10 TABLET ORAL DAILY
COMMUNITY

## 2023-07-02 RX ORDER — LABETALOL HYDROCHLORIDE 5 MG/ML
10 INJECTION, SOLUTION INTRAVENOUS EVERY 4 HOURS PRN
Status: DISCONTINUED | OUTPATIENT
Start: 2023-07-02 | End: 2023-07-04 | Stop reason: HOSPADM

## 2023-07-02 RX ORDER — ESTRADIOL 1 MG/1
1 TABLET ORAL
COMMUNITY

## 2023-07-02 RX ORDER — SODIUM CHLORIDE, SODIUM LACTATE, POTASSIUM CHLORIDE, AND CALCIUM CHLORIDE .6; .31; .03; .02 G/100ML; G/100ML; G/100ML; G/100ML
500 INJECTION, SOLUTION INTRAVENOUS
Status: DISCONTINUED | OUTPATIENT
Start: 2023-07-02 | End: 2023-07-04 | Stop reason: HOSPADM

## 2023-07-02 RX ORDER — BISACODYL 10 MG
10 SUPPOSITORY, RECTAL RECTAL
Status: DISCONTINUED | OUTPATIENT
Start: 2023-07-02 | End: 2023-07-04 | Stop reason: HOSPADM

## 2023-07-02 RX ORDER — SODIUM CHLORIDE, SODIUM LACTATE, POTASSIUM CHLORIDE, CALCIUM CHLORIDE 600; 310; 30; 20 MG/100ML; MG/100ML; MG/100ML; MG/100ML
INJECTION, SOLUTION INTRAVENOUS CONTINUOUS
Status: DISCONTINUED | OUTPATIENT
Start: 2023-07-02 | End: 2023-07-03

## 2023-07-02 RX ORDER — CYCLOBENZAPRINE HCL 10 MG
10 TABLET ORAL 3 TIMES DAILY PRN
COMMUNITY

## 2023-07-02 RX ORDER — ALBUTEROL SULFATE 90 UG/1
2 AEROSOL, METERED RESPIRATORY (INHALATION)
Status: DISCONTINUED | OUTPATIENT
Start: 2023-07-02 | End: 2023-07-04 | Stop reason: HOSPADM

## 2023-07-02 RX ADMIN — CYCLOBENZAPRINE 10 MG: 10 TABLET, FILM COATED ORAL at 22:21

## 2023-07-02 RX ADMIN — DIPHENHYDRAMINE HYDROCHLORIDE 25 MG: 50 INJECTION, SOLUTION INTRAMUSCULAR; INTRAVENOUS at 12:58

## 2023-07-02 RX ADMIN — AZITHROMYCIN DIHYDRATE 500 MG: 250 TABLET ORAL at 11:49

## 2023-07-02 RX ADMIN — PROCHLORPERAZINE EDISYLATE 10 MG: 5 INJECTION INTRAMUSCULAR; INTRAVENOUS at 12:57

## 2023-07-02 RX ADMIN — PREGABALIN 75 MG: 75 CAPSULE ORAL at 17:12

## 2023-07-02 RX ADMIN — SODIUM CHLORIDE, POTASSIUM CHLORIDE, SODIUM LACTATE AND CALCIUM CHLORIDE 1710 ML: 600; 310; 30; 20 INJECTION, SOLUTION INTRAVENOUS at 13:00

## 2023-07-02 RX ADMIN — AMPICILLIN AND SULBACTAM 3 G: 1; 2 INJECTION, POWDER, FOR SOLUTION INTRAMUSCULAR; INTRAVENOUS at 11:49

## 2023-07-02 RX ADMIN — SENNOSIDES AND DOCUSATE SODIUM 2 TABLET: 50; 8.6 TABLET ORAL at 17:12

## 2023-07-02 RX ADMIN — ACETAMINOPHEN 650 MG: 325 TABLET, FILM COATED ORAL at 22:21

## 2023-07-02 RX ADMIN — LISINOPRIL 10 MG: 10 TABLET ORAL at 17:12

## 2023-07-02 RX ADMIN — AMPICILLIN AND SULBACTAM 3 G: 1; 2 INJECTION, POWDER, FOR SOLUTION INTRAMUSCULAR; INTRAVENOUS at 17:16

## 2023-07-02 RX ADMIN — HEPARIN SODIUM 5000 UNITS: 5000 INJECTION, SOLUTION INTRAVENOUS; SUBCUTANEOUS at 17:13

## 2023-07-02 RX ADMIN — ACETAMINOPHEN 650 MG: 325 TABLET, FILM COATED ORAL at 12:57

## 2023-07-02 RX ADMIN — SODIUM CHLORIDE, POTASSIUM CHLORIDE, SODIUM LACTATE AND CALCIUM CHLORIDE: 600; 310; 30; 20 INJECTION, SOLUTION INTRAVENOUS at 13:00

## 2023-07-02 ASSESSMENT — COGNITIVE AND FUNCTIONAL STATUS - GENERAL
SUGGESTED CMS G CODE MODIFIER MOBILITY: CH
DAILY ACTIVITIY SCORE: 24
MOBILITY SCORE: 24
SUGGESTED CMS G CODE MODIFIER DAILY ACTIVITY: CH

## 2023-07-02 ASSESSMENT — PATIENT HEALTH QUESTIONNAIRE - PHQ9
1. LITTLE INTEREST OR PLEASURE IN DOING THINGS: NOT AT ALL
SUM OF ALL RESPONSES TO PHQ9 QUESTIONS 1 AND 2: 0
2. FEELING DOWN, DEPRESSED, IRRITABLE, OR HOPELESS: NOT AT ALL

## 2023-07-02 ASSESSMENT — PAIN DESCRIPTION - PAIN TYPE: TYPE: ACUTE PAIN

## 2023-07-02 ASSESSMENT — FIBROSIS 4 INDEX
FIB4 SCORE: 0.56
FIB4 SCORE: 0.58
FIB4 SCORE: 0.56

## 2023-07-02 ASSESSMENT — LIFESTYLE VARIABLES
TOTAL SCORE: 0
EVER FELT BAD OR GUILTY ABOUT YOUR DRINKING: NO
HOW MANY TIMES IN THE PAST YEAR HAVE YOU HAD 5 OR MORE DRINKS IN A DAY: 0
HAVE PEOPLE ANNOYED YOU BY CRITICIZING YOUR DRINKING: NO
ON A TYPICAL DAY WHEN YOU DRINK ALCOHOL HOW MANY DRINKS DO YOU HAVE: 0
TOTAL SCORE: 0
HAVE YOU EVER FELT YOU SHOULD CUT DOWN ON YOUR DRINKING: NO
DOES PATIENT WANT TO STOP DRINKING: NO
AVERAGE NUMBER OF DAYS PER WEEK YOU HAVE A DRINK CONTAINING ALCOHOL: 0
CONSUMPTION TOTAL: NEGATIVE
EVER HAD A DRINK FIRST THING IN THE MORNING TO STEADY YOUR NERVES TO GET RID OF A HANGOVER: NO
ALCOHOL_USE: NO
TOTAL SCORE: 0

## 2023-07-02 ASSESSMENT — ENCOUNTER SYMPTOMS
VOMITING: 0
SPUTUM PRODUCTION: 1
EYE DISCHARGE: 0
FEVER: 1
BACK PAIN: 0
DIZZINESS: 0
ABDOMINAL PAIN: 0
SHORTNESS OF BREATH: 1
SORE THROAT: 1
DEPRESSION: 1
STRIDOR: 0
CHILLS: 1
MYALGIAS: 1
NERVOUS/ANXIOUS: 0
PALPITATIONS: 0
COUGH: 1
SPEECH CHANGE: 0
DIARRHEA: 0
HEADACHES: 1
NAUSEA: 1

## 2023-07-02 ASSESSMENT — COPD QUESTIONNAIRES
COPD SCREENING SCORE: 3
HAVE YOU SMOKED AT LEAST 100 CIGARETTES IN YOUR ENTIRE LIFE: YES
DURING THE PAST 4 WEEKS HOW MUCH DID YOU FEEL SHORT OF BREATH: NONE/LITTLE OF THE TIME
DO YOU EVER COUGH UP ANY MUCUS OR PHLEGM?: NO/ONLY WITH OCCASIONAL COLDS OR INFECTIONS

## 2023-07-02 NOTE — ASSESSMENT & PLAN NOTE
Pneumonia per visualization on chest x-ray right middle lobe.  Procalcitonin pending  IV antibiotics  Supplemental oxygen  RT per protocol  Viral PCR negative  Sputum cultures with Gram stain  Continue IV unasyn

## 2023-07-02 NOTE — ED PROVIDER NOTES
ER Provider Note    Scribed for Julio César Begum D.o. by Betzaida Fine. 7/2/2023  11:18 AM    Primary Care Provider: Pcp Pt States None    CHIEF COMPLAINT  Chief Complaint   Patient presents with    Fever    Headache     Sore     Sore Throat    Cough         HPI/ROS  LIMITATION TO HISTORY   Select: : None  OUTSIDE HISTORIAN(S):  None     Janine Capellan is a 52 y.o. female with a history of asthma presents to the ED complaining of fever onset 3 days ago. She notes 103.2 °F fever. She tooks Dayquill with no alleviation of her symptoms. No exacerbating factors noted. Patient has an associated sore thorat, coughing, headache, nausea, dysuria, shortness of breath, weakness, body aches, decrease in appetite, light sensitivity, and lightheadedness. She notes her lightheadedness is exacerbated when she stands up or goes up stairs. Patient denies any vomiting, diarrhea, constipation, chest pain, back pain, chills, numbness or tingling. Patient reports using her Albuterol inhaler twice with minimal improvement. She denies being in contact with anyone who may be sick. She reports she has bowel incontinence, which is regular for her and she manages with diapers. Patient is prescribed with Lasix but she states she does not take them as prescribed because of her incontinent. Patient denies any prior surgeries. Denies new medication. Denies recent travel.     PAST MEDICAL HISTORY  Past Medical History:   Diagnosis Date    Asthma     GERD (gastroesophageal reflux disease)     Psychiatric disorder      SURGICAL HISTORY  Past Surgical History:   Procedure Laterality Date    HYSTERECTOMY LAPAROSCOPY      OTHER ORTHOPEDIC SURGERY      right femur, knee     FAMILY HISTORY  No pertinent family history    SOCIAL HISTORY   reports that she has quit smoking. Her smoking use included cigarettes. She smoked an average of .25 packs per day. She has never used smokeless tobacco. She reports that she does not currently use alcohol. She reports  "that she does not use drugs.    CURRENT MEDICATIONS  Current Discharge Medication List        CONTINUE these medications which have NOT CHANGED    Details   zolpidem (AMBIEN) 10 MG Tab Take 10 mg by mouth at bedtime.      traZODone (DESYREL) 100 MG Tab Take 100-200 mg by mouth at bedtime.      pregabalin (LYRICA) 75 MG Cap Take 75 mg by mouth 2 times a day.      estradiol (ESTRACE) 1 MG Tab Take 1 mg by mouth at bedtime.      Lumateperone Tosylate (CAPLYTA) 42 MG Cap Take 42 mg by mouth at bedtime.      lisinopril (PRINIVIL) 10 MG Tab Take 10 mg by mouth every day.      cyclobenzaprine (FLEXERIL) 10 mg Tab Take 10 mg by mouth 3 times a day as needed. Indications: Muscle Spasm      mirabegron ER (MYRBETRIQ) 25 MG TABLET SR 24 HR Take 25 mg by mouth every day.      diclofenac sodium (VOLTAREN) 1 % Gel Apply  topically 4 times a day as needed (for pain).      albuterol 108 (90 Base) MCG/ACT Aero Soln inhalation aerosol Inhale 2 Puffs every 6 hours as needed for Shortness of Breath.  Qty: 8.5 g, Refills: 0    Associated Diagnoses: Pneumonia due to COVID-19 virus      sertraline (ZOLOFT) 100 MG Tab Take 200 mg by mouth at bedtime.      omeprazole (PRILOSEC) 40 MG delayed-release capsule Take 40 mg by mouth every morning.      HYDROcodone/acetaminophen (NORCO)  MG Tab Take 1 Tablet by mouth 6 Times a Day.           ALLERGIES  Patient has no known allergies.    PHYSICAL EXAM  BP (!) 146/85   Pulse 91   Temp 36.3 °C (97.4 °F) (Temporal)   Resp (!) 22   Ht 1.651 m (5' 5\")   Wt 125 kg (274 lb 14.6 oz)   LMP 10/24/2009   SpO2 90%   BMI 45.75 kg/m²   Constitutional: Alert in no apparent distress.  HENT: No signs of trauma, Bilateral external ears normal, Nose normal. Uvula midline. Posterior erythema in her oropharynx.  Eyes: Pupils are equal and reactive, Conjunctiva normal, Non-icteric.   Neck: Normal range of motion, No tenderness, Supple, No stridor.   Lymphatic: No lymphadenopathy noted.   Cardiovascular: " Regular rate and rhythm, no murmurs.   Thorax & Lungs: Diminished breath sounds bilaterally, No respiratory distress, No wheezing, No chest tenderness.   Abdomen: Bowel sounds normal, Soft, No tenderness, No peritoneal signs, No masses, No pulsatile masses.   Skin: Warm, Dry, No erythema, No rash.   Back: No bony tenderness, No CVA tenderness.   Extremities: Intact distal pulses, No edema, No tenderness, No cyanosis.  Musculoskeletal: Good range of motion in all major joints. No tenderness to palpation or major deformities noted.   Neurologic: Alert , Normal motor function, Normal sensory function, No focal deficits noted.   Psychiatric: Affect normal, Judgment normal, Mood normal.      DIAGNOSTIC STUDIES  Labs:   Labs Reviewed   CBC WITH DIFFERENTIAL - Abnormal; Notable for the following components:       Result Value    WBC 22.2 (*)     MCH 26.5 (*)     MCHC 32.1 (*)     Neutrophils-Polys 83.60 (*)     Lymphocytes 8.70 (*)     Neutrophils (Absolute) 18.56 (*)     Monos (Absolute) 1.42 (*)     Immature Granulocytes (abs) 0.20 (*)     All other components within normal limits   COMP METABOLIC PANEL - Abnormal; Notable for the following components:    Glucose 134 (*)     Bun 5 (*)     Creatinine 0.44 (*)     Alkaline Phosphatase 106 (*)     All other components within normal limits   URINALYSIS - Abnormal; Notable for the following components:    Ketones Trace (*)     Protein 30 (*)     Occult Blood Small (*)     All other components within normal limits    Narrative:     Indication for culture:->Evaluation for sepsis without a  clear source of infection   GROUP A STREP BY PCR - Abnormal; Notable for the following components:    Group A Strep by PCR DETECTED (*)     All other components within normal limits   PROBRAIN NATRIURETIC PEPTIDE, NT - Abnormal; Notable for the following components:    NT-proBNP 1472 (*)     All other components within normal limits   URINE MICROSCOPIC (W/UA) - Abnormal; Notable for the  "following components:    RBC 5-10 (*)     All other components within normal limits    Narrative:     Indication for culture:->Evaluation for sepsis without a  clear source of infection   LACTIC ACID   COV-2, FLU A/B, AND RSV BY PCR (CEPHEID)   CORRECTED CALCIUM   ESTIMATED GFR   LIPASE   TROPONIN   CULTURE RESPIRATORY W/ GRM STN   URINE CULTURE(NEW)    Narrative:     Indication for culture:->Evaluation for sepsis without a  clear source of infection   BLOOD CULTURE    Narrative:     Per Hospital Policy: Only change Specimen Src: to \"Line\" if  specified by physician order.   BLOOD CULTURE    Narrative:     Per Hospital Policy: Only change Specimen Src: to \"Line\" if  specified by physician order.   LACTIC ACID   PROTHROMBIN TIME   BLOOD CULTURE   BLOOD CULTURE   URINALYSIS   PROCALCITONIN     EKG:   I have independently interpreted this EKG   Results for orders placed or performed during the hospital encounter of 23   EKG   Result Value Ref Range    Report       [facility] Emergency Dept.    Test Date:  2023  Pt Name:    WILNER WARNER                  Department: ER  MRN:        [id number]                      Room:        10  Gender:     Female                       Technician: [id number]  :        [id number]                   Requested By:ER TRIAGE PROTOCOL  Order #:    [id number]                    Reading MD:    Measurements  Intervals                                Axis  Rate:       89                           P:          61  MI:         134                          QRS:        51  QRSD:       95                           T:          78  QT:         387  QTc:        471    Interpretive Statements  Sinus rhythm  RSR' in V1 or V2, right VCD or RVH  Compared to ECG 2022 15:19:00  ST (T wave) deviation no longer present       Radiology:   The attending emergency physician has independently interpreted the diagnostic imaging associated with this visit and am waiting the final reading " from the radiologist.   Preliminary interpretation is a follows: no ptx  Radiologist interpretation:   DX-CHEST-PORTABLE (1 VIEW)   Final Result         1.  New right upper lobe infiltrate is identified which suggests pneumonia.      2.  Mild cardiomegaly and perihilar congestion also appear to be present.         COURSE & MEDICAL DECISION MAKING     ED Observation Status? Yes; I am placing the patient in to an observation status due to a diagnostic uncertainty as well as therapeutic intensity. Patient placed in observation status at 11:29 AM, 7/2/2023.     Observation plan is as follows: We will manage their symptoms, evaluate with lab work and imaging, and then reassess after results are reviewed      Upon Reevaluation, the patient's condition has: not improved; and will be escalated to hospitalization.    Patient discharged from ED Observation status at 12:33 PM (Time) 7/2/2023  (Date).     CRITICAL CARE  The very real possibilty of a deterioration of this patient's condition required the highest level of my preparedness for sudden, emergent intervention.  I provided critical care services, which included medication orders, frequent reevaluations of the patient's condition and response to treatment, ordering and reviewing test results, and discussing the case with various consultants.  The critical care time associated with the care of the patient was 40 minutes. Review chart for interventions. This time is exclusive of any other billable procedures.     INITIAL ASSESSMENT, COURSE AND PLAN  Care Narrative:   11:18 AM - Patient is a 52 year old female with a history of asthma presents to ED with a fever onset 3 days ago. Patient was first seen and evaluated at bedside. I discussed with the patient the plan of care, which includes treating the patient with medication for their symptoms, as well as obtaining lab work and imaging for further evaluation. Patient understands and verbalizes agreement to plan of care.    Patient will be treated with Unasyn 3 g, Zithromax 500 mg. Ordered EKG, Troponin, PPNT, Group A Strep, Cov-2, Flu A/B and RSV by PCR, Lipase, Lactic acid, CBC w/ Diff, and CMP.  The differential diagnoses include but are not limited to: Pneumonia, UA, CMP, Urine culture, blood culture, and DX-chest.     11:20 AM - IV fluids will be administered due to clinical signs of dehydration/tachycardia/hypotension/possible sepsis.   Following IV fluids the patient has improved.     12:21 PM - Patient was reevaluated at bedside. Discussed lab and radiology results with the patient and informed them findings showed pneumonia. I informed patent with plan of admission. Patient verbalizes understanding and agreement to this plan of care.       12:32 PM PM - I discussed the patient's case and the above findings with Dr. Begum (Hospitalist) who agreed with plan of admission.     Pt w/ hx and PE concerning for CAP   Pt started on Azithromycin and unasyn  Pt placed on 2NC  Doubt PE however will continue to monitor while in hospital and if no improvement of sx would consider broadening w/u  No e/o PTX  No hx of rib fx as underlying etiology    CRITICAL CARE  The very real possibilty of a deterioration of this patient's condition required the highest level of my preparedness for sudden, emergent intervention.  I provided critical care services, which included medication orders, frequent reevaluations of the patient's condition and response to treatment, ordering and reviewing test results, and discussing the case with various consultants.  The critical care time associated with the care of the patient was 43 minutes. Review chart for interventions. This time is exclusive of any other billable procedures.      DISPOSITION AND DISCUSSIONS  I have discussed management of the patient with the following physicians and SUSI's:  Dr. Begum (Hospitalist)    Discussion of management with other [facility] or appropriate source(s):  None    Barriers to  care at this time, including but not limited to: Patient does not have established PCP.     Decision tools and prescription drugs considered including, but not limited to:  None .    FINAL DIAGNOSIS  1. Acute respiratory failure with hypoxia (HCC)    2. Community acquired pneumonia of right middle lobe of lung      DISPOSITION:  Patient will be hospitalized by Dr. Begum in guarded condition.     The note accurately reflects work and decisions made by me.  Manjit Hinojosa M.D.  7/2/2023  4:23 PM

## 2023-07-02 NOTE — ASSESSMENT & PLAN NOTE
Viral panel negative  Chest x-ray showing right middle lobe pneumonia  IV antibiotics with Unasyn and azithromycin   supplemental oxygen RT protocol  Procalcitonin

## 2023-07-02 NOTE — ED TRIAGE NOTES
Chief Complaint   Patient presents with    Fever    Headache     Sore     Sore Throat    Cough     Pt wheeled to triage with above complaints x3days. Spo2 88% on room air, she was placed on 3L nc.   Provided with mask to wear

## 2023-07-02 NOTE — PROGRESS NOTES
4 Eyes Skin Assessment Completed by ANUJ Abarca and ANUJ Eagle.    Head WDL  Ears WDL  Nose WDL  Mouth WDL  Neck WDL  Breast/Chest WDL  Shoulder Blades WDL  Spine WDL  (R) Arm/Elbow/Hand WDL  (L) Arm/Elbow/Hand WDL  Abdomen WDL  Groin WDL  Scrotum/Coccyx/Buttocks WDL  (R) Leg WDL  (L) Leg WDL  (R) Heel/Foot/Toe WDL  (L) Heel/Foot/Toe WDL          Devices In Places Nasal Cannula      Interventions In Place Pillows    Possible Skin Injury Yes    Pictures Uploaded Into Epic N/A  Wound Consult Placed N/A  RN Wound Prevention Protocol Ordered No

## 2023-07-02 NOTE — H&P
"Hospital Medicine History & Physical Note    Date of Service  7/2/2023    Primary Care Physician  Pcp Pt States None      Code Status  Full Code    Chief Complaint  Chief Complaint   Patient presents with    Fever    Headache     Sore     Sore Throat    Cough       History of Presenting Illness  Janine Capellan is a morbidly obese 52 y.o. female with a history of depression, GERD, who presented 7/2/2023 with 3-day history of feeling \"ill.\"  Patient states she has had recent exposure to illness from one of her young family members.  Patient states she started out with sore throat coughing.  She states some on and off fevers.  Minimal congestion.  No diarrhea minimal nausea.    Here in the emergency room she was found to be hypoxic requiring supplemental oxygen.  Chest x-ray shows right lobe pneumonia, WBC 22 she has SIRS criteria.    I discussed the plan of care with patient.    Review of Systems  Review of Systems   Constitutional:  Positive for chills, fever and malaise/fatigue.   HENT:  Positive for congestion and sore throat.    Eyes:  Negative for discharge.   Respiratory:  Positive for cough, sputum production and shortness of breath. Negative for stridor.    Cardiovascular:  Negative for chest pain, palpitations and leg swelling.   Gastrointestinal:  Positive for nausea. Negative for abdominal pain, diarrhea and vomiting.   Genitourinary:  Negative for dysuria and hematuria.   Musculoskeletal:  Positive for myalgias. Negative for back pain and joint pain.   Neurological:  Positive for headaches. Negative for dizziness and speech change.   Psychiatric/Behavioral:  Positive for depression. The patient is not nervous/anxious.        Past Medical History   has a past medical history of Asthma, GERD (gastroesophageal reflux disease), and Psychiatric disorder.    Surgical History   has a past surgical history that includes hysterectomy laparoscopy and other orthopedic surgery.     Family History  Mother recently " passed from COPD  Family history reviewed with patient. There is no family history that is pertinent to the chief complaint.     Social History   reports that she has quit smoking. Her smoking use included cigarettes. She smoked an average of .25 packs per day. She has never used smokeless tobacco. She reports that she does not currently use alcohol. She reports that she does not use drugs.  She has children.  She is awaiting disability    Allergies  No Known Allergies    Medications  Prior to Admission Medications   Prescriptions Last Dose Informant Patient Reported? Taking?   HYDROXYZINE PAMOATE PO   Yes No   Sig: Take  by mouth.   Hydrocodone-Acetaminophen (NORCO PO)   Yes No   Sig: Take  by mouth.   OMEPRAZOLE PO   Yes No   Sig: Take  by mouth.   SERTRALINE HCL PO   Yes No   Sig: Take  by mouth.   albuterol 108 (90 Base) MCG/ACT Aero Soln inhalation aerosol   No No   Sig: Inhale 2 Puffs every 6 hours as needed for Shortness of Breath.   ondansetron (ZOFRAN ODT) 4 MG TABLET DISPERSIBLE   No No   Sig: Take 1 Tablet by mouth every 6 hours as needed for Nausea.   quetiapine (SEROQUEL) 100 MG TABS   No No   Sig: Take 1 Tab by mouth every evening.   quetiapine (SEROQUEL) 25 MG TABS   No No   Sig: Take 1 Tab by mouth every 6 hours as needed (agitation, anxiety).   vitamin D 2000 UNIT TABS   No No   Sig: Take 1 Tab by mouth every day.      Facility-Administered Medications: None       Physical Exam  Temp:  [36.3 °C (97.4 °F)] 36.3 °C (97.4 °F)  Pulse:  [91-94] 91  Resp:  [20-22] 20  BP: (116-146)/(75-93) 116/75  SpO2:  [88 %-93 %] 90 %  Blood Pressure: 116/75   Temperature: 36.3 °C (97.4 °F)   Pulse: 91   Respiration: 20   Pulse Oximetry: 90 %       Physical Exam  Vitals reviewed.   Constitutional:       Appearance: Normal appearance. She is obese. She is ill-appearing. She is not diaphoretic.   HENT:      Head: Normocephalic and atraumatic.      Nose: Nose normal.      Mouth/Throat:      Mouth: Mucous membranes are  moist.      Pharynx: No oropharyngeal exudate.   Eyes:      General: No scleral icterus.        Right eye: No discharge.         Left eye: No discharge.      Extraocular Movements: Extraocular movements intact.      Conjunctiva/sclera: Conjunctivae normal.   Neck:      Vascular: No carotid bruit.   Cardiovascular:      Rate and Rhythm: Normal rate and regular rhythm.      Pulses:           Radial pulses are 2+ on the right side and 2+ on the left side.        Dorsalis pedis pulses are 2+ on the right side and 2+ on the left side.      Heart sounds: No murmur heard.  Pulmonary:      Effort: Pulmonary effort is normal. No respiratory distress.      Breath sounds: Rhonchi present. No wheezing or rales.   Abdominal:      General: Bowel sounds are normal. There is no distension.      Palpations: Abdomen is soft.      Tenderness: There is no abdominal tenderness.   Musculoskeletal:         General: No swelling or tenderness.      Cervical back: No muscular tenderness.      Right lower leg: No edema.      Left lower leg: No edema.   Lymphadenopathy:      Cervical: No cervical adenopathy.   Skin:     Coloration: Skin is not jaundiced or pale.   Neurological:      General: No focal deficit present.      Mental Status: She is alert and oriented to person, place, and time. Mental status is at baseline.      Cranial Nerves: No cranial nerve deficit.   Psychiatric:         Mood and Affect: Mood normal.         Behavior: Behavior normal.         Laboratory:  Recent Labs     07/02/23  1050   WBC 22.2*   RBC 4.87   HEMOGLOBIN 12.9   HEMATOCRIT 40.2   MCV 82.5   MCH 26.5*   MCHC 32.1*   RDW 44.6   PLATELETCT 309   MPV 9.8     Recent Labs     07/02/23  1050   SODIUM 136   POTASSIUM 3.9   CHLORIDE 97   CO2 24   GLUCOSE 134*   BUN 5*   CREATININE 0.44*   CALCIUM 9.2     Recent Labs     07/02/23  1050   ALTSGPT 28   ASTSGOT 18   ALKPHOSPHAT 106*   TBILIRUBIN 0.6   LIPASE 12   GLUCOSE 134*         Recent Labs     07/02/23  1050    NTPROBNP 1472*         Recent Labs     07/02/23  1050   TROPONINT 12       Imaging:  DX-CHEST-PORTABLE (1 VIEW)   Final Result         1.  New right upper lobe infiltrate is identified which suggests pneumonia.      2.  Mild cardiomegaly and perihilar congestion also appear to be present.            Assessment/Plan:  Justification for Admission Status  I anticipate this patient will require at least two midnights for appropriate medical management, necessitating inpatient admission because treatment of active infection causing sepsis and supplemental oxygen with her acute respiratory failure.    Patient will need a Med/Surg bed on MEDICAL service .  The need is secondary to treatment of sepsis from pneumonia SIRS criteria and acute respiratory failure.    * Sepsis (HCC)- (present on admission)  Assessment & Plan  This is Sepsis Present on admission  SIRS criteria identified on my evaluation include: Tachycardia, with heart rate greater than 90 BPM, Tachypnea, with respirations greater than 20 per minute and Leukocytosis, with WBC greater than 12,000  Source is pneumonia  Sepsis protocol initiated  Fluid resuscitation ordered per protocol  Crystalloid Fluid Administration: Fluid resuscitation ordered per standard protocol - 30 mL/kg per current or ideal body weight  IV antibiotics as appropriate for source of sepsis  Reassessment: I have reassessed the patient's hemodynamic status    Acute respiratory failure (HCC)  Assessment & Plan  Viral swab pending for COVID/RSV/flu.  Chest x-ray showing right middle lobe pneumonia  IV antibiotics with Unasyn and azithromycin   supplemental oxygen RT protocol  Procalcitonin    Pneumonia  Assessment & Plan  Pneumonia per visualization on chest x-ray right middle lobe.  Procalcitonin pending  IV antibiotics  Supplemental oxygen  RT per protocol  Viral PCR pending  Sputum cultures with Gram stain    Class 3 severe obesity due to excess calories without serious comorbidity with  body mass index (BMI) of 45.0 to 49.9 in adult ([facility])  Assessment & Plan  Body mass index is 45.75 kg/m².    Tobacco abuse- (present on admission)  Assessment & Plan  Nicotine patch as need    Depression, major, recurrent, severe with psychosis ([facility])- (present on admission)  Assessment & Plan  Continue with sertraline for ongoing management        VTE prophylaxis: SCDs/TEDs and heparin ppx

## 2023-07-02 NOTE — ED NOTES
Ambulated back to room, hooked back up to 02@2l/nc, placed back on the monitor,pulse ox. Call light within the reach.

## 2023-07-02 NOTE — ED NOTES
Report to receiving ANUJ Abarca for [id number]. Questions answered. Transport took pt to floor, belongings accounted for at transport.

## 2023-07-03 LAB
ANION GAP SERPL CALC-SCNC: 14 MMOL/L (ref 7–16)
BUN SERPL-MCNC: 5 MG/DL (ref 8–22)
CALCIUM SERPL-MCNC: 9.1 MG/DL (ref 8.5–10.5)
CHLORIDE SERPL-SCNC: 98 MMOL/L (ref 96–112)
CO2 SERPL-SCNC: 24 MMOL/L (ref 20–33)
CREAT SERPL-MCNC: 0.45 MG/DL (ref 0.5–1.4)
ERYTHROCYTE [DISTWIDTH] IN BLOOD BY AUTOMATED COUNT: 45.6 FL (ref 35.9–50)
GFR SERPLBLD CREATININE-BSD FMLA CKD-EPI: 116 ML/MIN/1.73 M 2
GLUCOSE SERPL-MCNC: 98 MG/DL (ref 65–99)
HCT VFR BLD AUTO: 38.4 % (ref 37–47)
HGB BLD-MCNC: 11.7 G/DL (ref 12–16)
LACTATE SERPL-SCNC: 0.9 MMOL/L (ref 0.5–2)
MCH RBC QN AUTO: 25.8 PG (ref 27–33)
MCHC RBC AUTO-ENTMCNC: 30.5 G/DL (ref 32.2–35.5)
MCV RBC AUTO: 84.8 FL (ref 81.4–97.8)
PLATELET # BLD AUTO: 319 K/UL (ref 164–446)
PMV BLD AUTO: 9.5 FL (ref 9–12.9)
POTASSIUM SERPL-SCNC: 3.7 MMOL/L (ref 3.6–5.5)
RBC # BLD AUTO: 4.53 M/UL (ref 4.2–5.4)
SODIUM SERPL-SCNC: 136 MMOL/L (ref 135–145)
WBC # BLD AUTO: 22.5 K/UL (ref 4.8–10.8)

## 2023-07-03 PROCEDURE — 700102 HCHG RX REV CODE 250 W/ 637 OVERRIDE(OP): Performed by: INTERNAL MEDICINE

## 2023-07-03 PROCEDURE — 94760 N-INVAS EAR/PLS OXIMETRY 1: CPT

## 2023-07-03 PROCEDURE — 80048 BASIC METABOLIC PNL TOTAL CA: CPT

## 2023-07-03 PROCEDURE — 85027 COMPLETE CBC AUTOMATED: CPT

## 2023-07-03 PROCEDURE — 770001 HCHG ROOM/CARE - MED/SURG/GYN PRIV*

## 2023-07-03 PROCEDURE — A9270 NON-COVERED ITEM OR SERVICE: HCPCS | Performed by: INTERNAL MEDICINE

## 2023-07-03 PROCEDURE — 99233 SBSQ HOSP IP/OBS HIGH 50: CPT | Performed by: INTERNAL MEDICINE

## 2023-07-03 PROCEDURE — A9270 NON-COVERED ITEM OR SERVICE: HCPCS | Performed by: HOSPITALIST

## 2023-07-03 PROCEDURE — 36415 COLL VENOUS BLD VENIPUNCTURE: CPT

## 2023-07-03 PROCEDURE — 700102 HCHG RX REV CODE 250 W/ 637 OVERRIDE(OP): Performed by: HOSPITALIST

## 2023-07-03 PROCEDURE — 83605 ASSAY OF LACTIC ACID: CPT

## 2023-07-03 PROCEDURE — 700105 HCHG RX REV CODE 258: Performed by: HOSPITALIST

## 2023-07-03 PROCEDURE — 700111 HCHG RX REV CODE 636 W/ 250 OVERRIDE (IP): Mod: JZ | Performed by: HOSPITALIST

## 2023-07-03 PROCEDURE — 94669 MECHANICAL CHEST WALL OSCILL: CPT

## 2023-07-03 RX ADMIN — AMPICILLIN AND SULBACTAM 3 G: 1; 2 INJECTION, POWDER, FOR SOLUTION INTRAMUSCULAR; INTRAVENOUS at 18:07

## 2023-07-03 RX ADMIN — PREGABALIN 75 MG: 75 CAPSULE ORAL at 05:27

## 2023-07-03 RX ADMIN — RIVAROXABAN 10 MG: 10 TABLET, FILM COATED ORAL at 18:08

## 2023-07-03 RX ADMIN — LISINOPRIL 10 MG: 10 TABLET ORAL at 05:27

## 2023-07-03 RX ADMIN — TRAZODONE HYDROCHLORIDE 100 MG: 50 TABLET ORAL at 20:24

## 2023-07-03 RX ADMIN — PREGABALIN 75 MG: 75 CAPSULE ORAL at 18:08

## 2023-07-03 RX ADMIN — HEPARIN SODIUM 5000 UNITS: 5000 INJECTION, SOLUTION INTRAVENOUS; SUBCUTANEOUS at 05:27

## 2023-07-03 RX ADMIN — AMPICILLIN AND SULBACTAM 3 G: 1; 2 INJECTION, POWDER, FOR SOLUTION INTRAMUSCULAR; INTRAVENOUS at 06:31

## 2023-07-03 RX ADMIN — AMPICILLIN AND SULBACTAM 3 G: 1; 2 INJECTION, POWDER, FOR SOLUTION INTRAMUSCULAR; INTRAVENOUS at 04:11

## 2023-07-03 RX ADMIN — AMPICILLIN AND SULBACTAM 3 G: 1; 2 INJECTION, POWDER, FOR SOLUTION INTRAMUSCULAR; INTRAVENOUS at 12:18

## 2023-07-03 RX ADMIN — ZOLPIDEM TARTRATE 10 MG: 5 TABLET ORAL at 21:21

## 2023-07-03 RX ADMIN — HEPARIN SODIUM 5000 UNITS: 5000 INJECTION, SOLUTION INTRAVENOUS; SUBCUTANEOUS at 14:12

## 2023-07-03 ASSESSMENT — ENCOUNTER SYMPTOMS
SORE THROAT: 0
HEADACHES: 1
BLURRED VISION: 0
FEVER: 0
COUGH: 1
NAUSEA: 0
BACK PAIN: 0
ABDOMINAL PAIN: 0
CHILLS: 0
VOMITING: 0
PALPITATIONS: 0
DOUBLE VISION: 0
HALLUCINATIONS: 0
SHORTNESS OF BREATH: 1
FALLS: 0
LOSS OF CONSCIOUSNESS: 0

## 2023-07-03 ASSESSMENT — PATIENT HEALTH QUESTIONNAIRE - PHQ9
2. FEELING DOWN, DEPRESSED, IRRITABLE, OR HOPELESS: NOT AT ALL
1. LITTLE INTEREST OR PLEASURE IN DOING THINGS: NOT AT ALL
SUM OF ALL RESPONSES TO PHQ9 QUESTIONS 1 AND 2: 0

## 2023-07-03 ASSESSMENT — PAIN DESCRIPTION - PAIN TYPE: TYPE: ACUTE PAIN

## 2023-07-03 ASSESSMENT — LIFESTYLE VARIABLES: SUBSTANCE_ABUSE: 0

## 2023-07-03 NOTE — PROGRESS NOTES
"Hospital Medicine Daily Progress Note    Date of Service  7/3/2023    Chief Complaint  Janine Capellan is a 52 y.o. female admitted 7/2/2023 with general [facility]    Hospital Course  Janine Capellan is a morbidly obese 52 y.o. female with a history of depression, GERD, who presented 7/2/2023 with 3-day history of feeling \"ill.\"  Patient states she has had recent exposure to illness from one of her young family members.  Patient states she started out with sore throat coughing.  She states some on and off fevers.  Minimal congestion.  No diarrhea minimal nausea.     Here in the emergency room she was found to be hypoxic requiring supplemental oxygen.  Chest x-ray shows right lobe pneumonia, WBC 22 she has SIRS criteria.    Interval Problem Update  Patient was seen and examined at bedside.  No acute events overnight. Patient is resting comfortably in bed and in no acute distress.     IV unasyn for pneumonia  On 2L supplemental oxygen    I have discussed this patient's plan of care and discharge plan at IDT rounds today with Case Management, Nursing, Nursing leadership, and other members of the IDT team.     Code Status  Full Code    Disposition  The patient is not medically cleared for discharge to home or a post-acute facility.  Anticipate discharge to: home with close outpatient follow-up    I have placed the appropriate orders for post-discharge needs.    Review of Systems  Review of Systems   Constitutional:  Positive for malaise/fatigue. Negative for chills and fever.   HENT:  Positive for congestion. Negative for sore throat.    Eyes:  Negative for blurred vision and double vision.   Respiratory:  Positive for cough and shortness of breath.    Cardiovascular:  Negative for chest pain and palpitations.   Gastrointestinal:  Negative for abdominal pain, nausea and vomiting.   Genitourinary:  Negative for dysuria and frequency.   Musculoskeletal:  Negative for back pain and falls.   Skin:  Negative for rash. "   Neurological:  Positive for headaches. Negative for loss of consciousness.   Psychiatric/Behavioral:  Negative for hallucinations and substance abuse.         Physical Exam  Temp:  [36.2 °C (97.1 °F)-37.5 °C (99.5 °F)] 36.5 °C (97.7 °F)  Pulse:  [79-92] 80  Resp:  [16-20] 18  BP: (120-186)/() 120/58  SpO2:  [91 %-96 %] 96 %    Physical Exam  Vitals and nursing note reviewed.   Constitutional:       General: She is not in acute distress.     Appearance: She is obese. She is not toxic-appearing.   HENT:      Right Ear: External ear normal.      Left Ear: External ear normal.      Nose: No rhinorrhea.      Mouth/Throat:      Mouth: Mucous membranes are dry.      Pharynx: No oropharyngeal exudate.   Eyes:      General: No scleral icterus.     Pupils: Pupils are equal, round, and reactive to light.   Cardiovascular:      Rate and Rhythm: Normal rate and regular rhythm.      Heart sounds: No murmur heard.  Pulmonary:      Breath sounds: Wheezing present.   Abdominal:      Palpations: Abdomen is soft.      Tenderness: There is no abdominal tenderness. There is no guarding or rebound.   Musculoskeletal:         General: No swelling or deformity.   Skin:     Coloration: Skin is not jaundiced.      Findings: No bruising.   Neurological:      General: No focal deficit present.      Mental Status: She is alert.      Motor: No weakness.   Psychiatric:         Mood and Affect: Mood normal.         Behavior: Behavior normal.         Fluids    Intake/Output Summary (Last 24 hours) at 7/3/2023 1428  Last data filed at 7/3/2023 1000  Gross per 24 hour   Intake 360 ml   Output --   Net 360 ml       Laboratory  Recent Labs     07/02/23  1050 07/03/23  0107   WBC 22.2* 22.5*   RBC 4.87 4.53   HEMOGLOBIN 12.9 11.7*   HEMATOCRIT 40.2 38.4   MCV 82.5 84.8   MCH 26.5* 25.8*   MCHC 32.1* 30.5*   RDW 44.6 45.6   PLATELETCT 309 319   MPV 9.8 9.5     Recent Labs     07/02/23  1050 07/03/23  0107   SODIUM 136 136   POTASSIUM 3.9 3.7    CHLORIDE 97 98   CO2 24 24   GLUCOSE 134* 98   BUN 5* 5*   CREATININE 0.44* 0.45*   CALCIUM 9.2 9.1                   Imaging  DX-CHEST-PORTABLE (1 VIEW)   Final Result         1.  New right upper lobe infiltrate is identified which suggests pneumonia.      2.  Mild cardiomegaly and perihilar congestion also appear to be present.           Assessment/Plan  * Pneumonia  Assessment & Plan  Pneumonia per visualization on chest x-ray right middle lobe.  Procalcitonin pending  IV antibiotics  Supplemental oxygen  RT per protocol  Viral PCR negative  Sputum cultures with Gram stain  Continue IV unasyn    Acute respiratory failure (HCC)  Assessment & Plan  Viral panel negative  Chest x-ray showing right middle lobe pneumonia  IV antibiotics with Unasyn and azithromycin   supplemental oxygen RT protocol  Procalcitonin    Class 3 severe obesity due to excess calories without serious comorbidity with body mass index (BMI) of 45.0 to 49.9 in adult ([facility])  Assessment & Plan  Body mass index is 45.75 kg/m².    Sepsis ([facility])- (present on admission)  Assessment & Plan  This is Sepsis Present on admission    Tobacco abuse- (present on admission)  Assessment & Plan  Tobacco cessation counseling and education provided for 4 minutes. Nicotine replacement options provided including patch, and further medical treatments including Wellbutrin and Chantix. As well as over the counter options of lozenges and gum.    Depression, major, recurrent, severe with psychosis ([facility])- (present on admission)  Assessment & Plan  Continue with sertraline for ongoing management         VTE prophylaxis: SCDs/TEDs and Xarelto 10 mg daily as prophylaxis    I have performed a physical exam and reviewed and updated ROS and Plan today (7/3/2023). In review of yesterday's note (7/2/2023), there are no changes except as documented above.    The very real possibility of a deterioration of this patient’s condition required the highest level of my preparedness for  sudden, emergent intervention. I provided critical care services, which included evaluating hypoxia and pneumonia, medication orders including IV antibiotics, frequent reevaluations of the patient’s condition and response to treatment.

## 2023-07-03 NOTE — DIETARY
NUTRITION SERVICES: BMI - Pt with BMI >40 (=Body mass index is 45.12 kg/m².), Class III obesity. Weight loss counseling not appropriate in acute care setting. RECOMMEND - If appropriate at DC please refer to outpatient nutrition services for weight management.

## 2023-07-03 NOTE — CARE PLAN
The patient is Stable - Low risk of patient condition declining or worsening    Shift Goals  Clinical Goals: Abx, fluids, rest  Patient Goals: Rest    Progress made toward(s) clinical / shift goals:   Pt is A&Ox 4,  2 L o2, ambulatory w/ no assist. All medications taken and tolerated, medicated for pain per MAR. Pt resting in bed w/ call light and belongings in reach, bed locked in lowest position, hourly rounding in place. No additional needs at this time.     Problem: Pain - Standard  Goal: Alleviation of pain or a reduction in pain to the patient’s comfort goal  Outcome: Progressing     Problem: Knowledge Deficit - Standard  Goal: Patient and family/care givers will demonstrate understanding of plan of care, disease process/condition, diagnostic tests and medications  Outcome: Progressing     Problem: Skin Integrity  Goal: Skin integrity is maintained or improved  Outcome: Progressing

## 2023-07-03 NOTE — HOSPITAL COURSE
"Janine Capellan is a morbidly obese 52 y.o. female with a history of depression, GERD, who presented 7/2/2023 with 3-day history of feeling \"ill.\"  Patient states she has had recent exposure to illness from one of her young family members.  Patient states she started out with sore throat coughing.  She states some on and off fevers.  Minimal congestion.  No diarrhea minimal nausea.     Here in the emergency room she was found to be hypoxic requiring supplemental oxygen.  Chest x-ray shows right lobe pneumonia, WBC 22 she has SIRS criteria.  "

## 2023-07-04 ENCOUNTER — PHARMACY VISIT (OUTPATIENT)
Dept: PHARMACY | Facility: MEDICAL CENTER | Age: 53
End: 2023-07-04
Payer: COMMERCIAL

## 2023-07-04 VITALS
SYSTOLIC BLOOD PRESSURE: 147 MMHG | HEART RATE: 85 BPM | WEIGHT: 271.17 LBS | HEIGHT: 65 IN | TEMPERATURE: 99.5 F | BODY MASS INDEX: 45.18 KG/M2 | RESPIRATION RATE: 18 BRPM | OXYGEN SATURATION: 92 % | DIASTOLIC BLOOD PRESSURE: 72 MMHG

## 2023-07-04 LAB
BACTERIA UR CULT: NORMAL
SIGNIFICANT IND 70042: NORMAL
SITE SITE: NORMAL
SOURCE SOURCE: NORMAL

## 2023-07-04 PROCEDURE — 700105 HCHG RX REV CODE 258: Performed by: HOSPITALIST

## 2023-07-04 PROCEDURE — A9270 NON-COVERED ITEM OR SERVICE: HCPCS | Performed by: HOSPITALIST

## 2023-07-04 PROCEDURE — 700111 HCHG RX REV CODE 636 W/ 250 OVERRIDE (IP): Mod: JZ | Performed by: HOSPITALIST

## 2023-07-04 PROCEDURE — 99239 HOSP IP/OBS DSCHRG MGMT >30: CPT | Performed by: STUDENT IN AN ORGANIZED HEALTH CARE EDUCATION/TRAINING PROGRAM

## 2023-07-04 PROCEDURE — 94760 N-INVAS EAR/PLS OXIMETRY 1: CPT

## 2023-07-04 PROCEDURE — 94669 MECHANICAL CHEST WALL OSCILL: CPT

## 2023-07-04 PROCEDURE — RXMED WILLOW AMBULATORY MEDICATION CHARGE: Performed by: STUDENT IN AN ORGANIZED HEALTH CARE EDUCATION/TRAINING PROGRAM

## 2023-07-04 PROCEDURE — 700102 HCHG RX REV CODE 250 W/ 637 OVERRIDE(OP): Performed by: HOSPITALIST

## 2023-07-04 RX ORDER — GUAIFENESIN 600 MG/1
600 TABLET, EXTENDED RELEASE ORAL EVERY 12 HOURS
Qty: 10 TABLET | Refills: 0 | Status: SHIPPED | OUTPATIENT
Start: 2023-07-04 | End: 2023-07-09

## 2023-07-04 RX ORDER — AMOXICILLIN AND CLAVULANATE POTASSIUM 875; 125 MG/1; MG/1
1 TABLET, FILM COATED ORAL 2 TIMES DAILY
Qty: 10 TABLET | Refills: 0 | Status: ACTIVE | OUTPATIENT
Start: 2023-07-04 | End: 2023-07-09

## 2023-07-04 RX ORDER — ZOLPIDEM TARTRATE 10 MG/1
10 TABLET ORAL
Qty: 7 TABLET | Refills: 0 | Status: SHIPPED | OUTPATIENT
Start: 2023-07-04 | End: 2023-07-11

## 2023-07-04 RX ADMIN — AMPICILLIN AND SULBACTAM 3 G: 1; 2 INJECTION, POWDER, FOR SOLUTION INTRAMUSCULAR; INTRAVENOUS at 11:40

## 2023-07-04 RX ADMIN — AMPICILLIN AND SULBACTAM 3 G: 1; 2 INJECTION, POWDER, FOR SOLUTION INTRAMUSCULAR; INTRAVENOUS at 00:29

## 2023-07-04 RX ADMIN — AMPICILLIN AND SULBACTAM 3 G: 1; 2 INJECTION, POWDER, FOR SOLUTION INTRAMUSCULAR; INTRAVENOUS at 05:34

## 2023-07-04 RX ADMIN — PREGABALIN 75 MG: 75 CAPSULE ORAL at 05:34

## 2023-07-04 RX ADMIN — LISINOPRIL 10 MG: 10 TABLET ORAL at 05:34

## 2023-07-04 NOTE — CARE PLAN
Assumed care of pt. Pt aaox4, on 2lpm oxygen via nasal cannula maintaining good saturations, denies pain, pt ambulatory with steady gait, VSS. Pt calls appropriately, bed on the lowest position with brakes on, non skid footwear on. No needs at this time.     The patient is Stable - Low risk of patient condition declining or worsening    Shift Goals  Clinical Goals: IV abx, wean off o2  Patient Goals: Rest    Progress made toward(s) clinical / shift goals: Pt slowly weaning off oxygen.    Problem: Pain - Standard  Goal: Alleviation of pain or a reduction in pain to the patient’s comfort goal  Outcome: Progressing     Problem: Respiratory  Goal: Patient will achieve/maintain optimum respiratory ventilation and gas exchange  Outcome: Progressing     Problem: Skin Integrity  Goal: Skin integrity is maintained or improved  Outcome: Progressing       Patient is not progressing towards the following goals:

## 2023-07-04 NOTE — DISCHARGE PLANNING
Case Management Discharge Planning    Admission Date: 7/2/2023  GMLOS: 5  ALOS: 2    6-Clicks ADL Score: 24  6-Clicks Mobility Score: 24      Anticipated Discharge Dispo: Discharge Disposition: Discharged to home/self care (01)    DME Needed: Yes    DME Ordered: Yes    Action(s) Taken: Updated Provider/Nurse on Discharge Plan, Choice obtained, and Referral(s) sent    Escalations Completed: None    Medically Clear: Yes    Next Steps: RNCM notified that patient to discharge today and will need home oxygen. RNCM met with patient, obtained choice and faxed to [facility] for processing.     RNCM notified by pharmacy- [facility] that they are not contracted with patient's insurance and will need ASF for antibiotic cost $8.31. RNCM completed ASF and faxed to pharmacy.    Oxygen delivered to bedside.    Barriers to Discharge: DME, Oxygen Delivery, and Transportation    Is the patient up for discharge tomorrow: No- today

## 2023-07-04 NOTE — FACE TO FACE
"Face to Face Note  -  Durable Medical Equipment    Edgar Barrera M.D. - NPI: [id number]  I certify that this patient is under my care and that they had a durable medical equipment(DME)face to face encounter by myself that meets the physician DME face-to-face encounter requirements with this patient on:    Date of encounter:   Patient:                    MRN:                       YOB: 2022  Janine Capellan  [id number]  1970     The encounter with the patient was in whole, or in part, for the following medical condition, which is the primary reason for durable medical equipment:  Other - pneumonia    I certify that, based on my findings, the following durable medical equipment is medically necessary:    Oxygen   HOME O2 Saturation Measurements:(Values must be present for Home Oxygen orders)  Room air sat at rest: 88  Room air sat with amb: 84  With liters of O2: 3, O2 sat at rest with O2: 91  With Liters of O2: 3, O2 sat with amb with O2 : 91  Is the patient mobile?: Yes  If patient feels more short of breath, they can go up to 6 liters per minute and contact healthcare provider.    Supporting Symptoms: The patient requires supplemental oxygen, as the following interventions have been tried with limited or no improvement: \"Ambulation with oximetry.    My Clinical findings support the need for the above equipment due to:  Hypoxia  "

## 2023-07-04 NOTE — CARE PLAN
Problem: Knowledge Deficit - Standard  Goal: Patient and family/care givers will demonstrate understanding of plan of care, disease process/condition, diagnostic tests and medications  Outcome: Progressing  Note: Iv antibiotics, titrate o2 needs     Problem: Respiratory  Goal: Patient will achieve/maintain optimum respiratory ventilation and gas exchange  Outcome: Progressing  Note: Encouraged use of IS   The patient is Stable - Low risk of patient condition declining or worsening    Shift Goals  Clinical Goals: wean off oxygen  Patient Goals: discharge as soon as able  Family Goals: n/a    Progress made toward(s) clinical / shift goals:  improved o2 needs    Patient is not progressing towards the following goals:

## 2023-07-04 NOTE — PROGRESS NOTES
Pt A&Ox4. No signs of respiratory distress. Pt able to get self ready for discharge. IV removed. AVS signed and discharge questions answered. Pt requesting to have a note saying she was at the hospital. This RN printed out work note from AVS tab and sent home with patient. MD was not around to sign. Pt aware that MD could not sign and pt stated she would call the hospital if she needed to have a signed note. Oxygen with patient. No other questions at this time.

## 2023-07-04 NOTE — PROGRESS NOTES
Received in bed, sleeping, aaox4, wants to shower, POC discussed, droplet isolation in place, up adlib in room, IV antibiotics, encouraged use of IS, titrate O2 needs.

## 2023-07-04 NOTE — DISCHARGE PLANNING
Received Choice form at   Agency/Facility Name: [facility]  Referral sent per Choice form @ 1214    [facility] number (if applicable):   LOC number (if applicable):     @1215  Agency/Facility Name: [facility] [place], [place]  Spoke To: [facility] - [facility]i service  Outcome: ROULA gave a verbal O2 referral to [facility].  The on-call  in [place] stated the [facility] branch will deliver portable tanks to the hospital and the [place] branch will deliver the concentrator.      Danny Hennessy will do the discharge home set up tomorrow.    Porfirio with [facility] will deliver tanks to the hospital.  [facility] cell phone is [phone].    @5264  ROULA printed the DME O2 referral and gave to Porfirio.  Porfirio delivered portable tanks and concentrator bedside to the pt.

## 2023-07-04 NOTE — CARE PLAN
Problem: Hyperinflation  Goal: Prevent or improve atelectasis  Description: Target End Date:  3 to 4 days    1. Instruct incentive spirometry usage  2.  Perform hyperinflation therapy as indicated  Outcome: Not Met   PEP QID

## 2023-07-04 NOTE — CARE PLAN
The patient is Watcher - Medium risk of patient condition declining or worsening    Shift Goals  Clinical Goals: wean off oxygen  Patient Goals: discharge as soon as able  Family Goals: n/a    Progress made toward(s) clinical / shift goals:   Patient is currently on 1.5 LPM via NC of oxygen  was on 2 LPM last night. Still has SOB on exertion sats will drop to 86-88% but does recover quickly. Pt tried on room air with sats 88% even at rest.. Ppt was informed and instructed she still need to wear the nasal cannula. Pt  verbalized  understanding.       Problem: Pain - Standard  Goal: Alleviation of pain or a reduction in pain to the patient’s comfort goal  Outcome: Progressing     Problem: Knowledge Deficit - Standard  Goal: Patient and family/care givers will demonstrate understanding of plan of care, disease process/condition, diagnostic tests and medications  Outcome: Progressing     Problem: Hemodynamics  Goal: Patient's hemodynamics, fluid balance and neurologic status will be stable or improve  Outcome: Progressing     Problem: Respiratory  Goal: Patient will achieve/maintain optimum respiratory ventilation and gas exchange  Outcome: Progressing     Patient is not progressing towards the following goals:

## 2023-07-05 NOTE — DISCHARGE PLANNING
Agency/Facility Name: [facility]   Spoke To: Jade  Outcome: Referral is accepted.  Disregard the faxed notice that was declining the DME O2 referral.  H&P states the pt has a history of asthma.

## 2023-07-05 NOTE — DISCHARGE SUMMARY
"Discharge Summary    CHIEF COMPLAINT ON ADMISSION  Chief Complaint   Patient presents with    Fever    Headache     Sore     Sore Throat    Cough       Reason for Admission  Flu like symptoms      Admission Date  7/2/2023    CODE STATUS  Full Code    HPI & HOSPITAL COURSE  Janine Capellan is a morbidly obese 52 y.o. female with a history of depression, GERD, who presented 7/2/2023 with 3-day history of feeling \"ill.\"  Patient states she has had recent exposure to illness from one of her young family members.  Patient states she started out with sore throat coughing.  She states some on and off fevers.  Minimal congestion.  No diarrhea minimal nausea.  Here in the emergency room she was found to be hypoxic requiring supplemental oxygen.  Chest x-ray shows right lobe pneumonia, WBC 22 she has SIRS criteria. Patient admitted for acute hypoxic respiratory failure due to pneumonia. She was found to have group A strep on swab. She was treated with antibiotics and improved clinically. She is on 3L oxygen at time of discharge. She is to complete antibiotic course at home and follow up with her PCP.    Therefore, she is discharged in good and stable condition to home with close outpatient follow-up.    The patient met 2-midnight criteria for an inpatient stay at the time of discharge.    Discharge Date  7/4/2023    FOLLOW UP ITEMS POST DISCHARGE  Take medications as prescribed.  Follow up with PCP.    DISCHARGE DIAGNOSES  Principal Problem:    Pneumonia (POA: Unknown)  Active Problems:    Depression, major, recurrent, severe with psychosis (HCC) (POA: Yes)    Tobacco abuse (POA: Yes)    Sepsis (HCC) (POA: Yes)    Class 3 severe obesity due to excess calories without serious comorbidity with body mass index (BMI) of 45.0 to 49.9 in adult (HCC) (POA: Unknown)    Acute respiratory failure (HCC) (POA: Unknown)  Resolved Problems:    * No resolved hospital problems. *      FOLLOW UP  No future appointments.  No follow-up " provider specified.    MEDICATIONS ON DISCHARGE     Medication List        START taking these medications        Instructions   amoxicillin-clavulanate 875-125 MG Tabs  Commonly known as: Augmentin   Take 1 Tablet by mouth 2 times a day for 5 days.  Dose: 1 Tablet     guaiFENesin  MG Tb12  Commonly known as: Mucinex   Take 1 Tablet by mouth every 12 hours for 5 days.  Dose: 600 mg            CONTINUE taking these medications        Instructions   albuterol 108 (90 Base) MCG/ACT Aers inhalation aerosol   Inhale 2 Puffs every 6 hours as needed for Shortness of Breath.  Dose: 2 Puff     Caplyta 42 MG Caps  Generic drug: Lumateperone Tosylate   Take 42 mg by mouth at bedtime.  Dose: 42 mg     cyclobenzaprine 10 mg Tabs  Commonly known as: Flexeril   Take 10 mg by mouth 3 times a day as needed. Indications: Muscle Spasm  Dose: 10 mg     estradiol 1 MG Tabs  Commonly known as: Estrace   Take 1 mg by mouth at bedtime.  Dose: 1 mg     HYDROcodone/acetaminophen  MG Tabs  Commonly known as: Norco   Take 1 Tablet by mouth 6 Times a Day.  Dose: 1 Tablet     lisinopril 10 MG Tabs  Commonly known as: Prinivil   Take 10 mg by mouth every day.  Dose: 10 mg     Myrbetriq 25 MG Tb24  Generic drug: mirabegron ER   Take 25 mg by mouth every day.  Dose: 25 mg     omeprazole 40 MG delayed-release capsule  Commonly known as: PriLOSEC   Take 40 mg by mouth every morning.  Dose: 40 mg     pregabalin 75 MG Caps  Commonly known as: Lyrica   Take 75 mg by mouth 2 times a day.  Dose: 75 mg     sertraline 100 MG Tabs  Commonly known as: Zoloft   Take 200 mg by mouth at bedtime.  Dose: 200 mg     traZODone 100 MG Tabs  Commonly known as: Desyrel   Take 100-200 mg by mouth at bedtime.  Dose: 100-200 mg     Voltaren 1 % Gel  Generic drug: diclofenac sodium   Apply  topically 4 times a day as needed (for pain).     zolpidem 10 MG Tabs  Commonly known as: Ambien   Take 1 Tablet by mouth at bedtime for 7 days.  Dose: 10 mg               Allergies  No Known Allergies    DIET  Orders Placed This Encounter   Procedures    Diet Order Diet: Regular     Standing Status:   Standing     Number of Occurrences:   1     Order Specific Question:   Diet:     Answer:   Regular [1]       ACTIVITY  As tolerated.  Weight bearing as tolerated    CONSULTATIONS  none    PROCEDURES  none    LABORATORY  Lab Results   Component Value Date    SODIUM 136 07/03/2023    POTASSIUM 3.7 07/03/2023    CHLORIDE 98 07/03/2023    CO2 24 07/03/2023    GLUCOSE 98 07/03/2023    BUN 5 (L) 07/03/2023    CREATININE 0.45 (L) 07/03/2023        Lab Results   Component Value Date    WBC 22.5 (H) 07/03/2023    HEMOGLOBIN 11.7 (L) 07/03/2023    HEMATOCRIT 38.4 07/03/2023    PLATELETCT 319 07/03/2023        Total time of the discharge process exceeds 33 minutes.

## 2023-07-07 LAB
BACTERIA BLD CULT: NORMAL
BACTERIA BLD CULT: NORMAL
SIGNIFICANT IND 70042: NORMAL
SIGNIFICANT IND 70042: NORMAL
SITE SITE: NORMAL
SITE SITE: NORMAL
SOURCE SOURCE: NORMAL
SOURCE SOURCE: NORMAL

## 2024-04-19 ENCOUNTER — OFFICE VISIT (OUTPATIENT)
Dept: URGENT CARE | Facility: PHYSICIAN GROUP | Age: 54
End: 2024-04-19
Payer: MEDICARE

## 2024-04-19 VITALS
DIASTOLIC BLOOD PRESSURE: 94 MMHG | RESPIRATION RATE: 16 BRPM | BODY MASS INDEX: 44.98 KG/M2 | WEIGHT: 270 LBS | OXYGEN SATURATION: 94 % | TEMPERATURE: 98.6 F | SYSTOLIC BLOOD PRESSURE: 138 MMHG | HEIGHT: 65 IN | HEART RATE: 83 BPM

## 2024-04-19 DIAGNOSIS — M10.9 ACUTE GOUT OF MULTIPLE SITES, UNSPECIFIED CAUSE: ICD-10-CM

## 2024-04-19 PROCEDURE — 99204 OFFICE O/P NEW MOD 45 MIN: CPT | Performed by: STUDENT IN AN ORGANIZED HEALTH CARE EDUCATION/TRAINING PROGRAM

## 2024-04-19 PROCEDURE — 3080F DIAST BP >= 90 MM HG: CPT | Performed by: STUDENT IN AN ORGANIZED HEALTH CARE EDUCATION/TRAINING PROGRAM

## 2024-04-19 PROCEDURE — 3075F SYST BP GE 130 - 139MM HG: CPT | Performed by: STUDENT IN AN ORGANIZED HEALTH CARE EDUCATION/TRAINING PROGRAM

## 2024-04-19 RX ORDER — PREDNISONE 20 MG/1
40 TABLET ORAL DAILY
Qty: 10 TABLET | Refills: 0 | Status: SHIPPED | OUTPATIENT
Start: 2024-04-19 | End: 2024-04-24

## 2024-04-19 RX ORDER — ZOLPIDEM TARTRATE 10 MG/1
TABLET ORAL
COMMUNITY
Start: 2024-03-27

## 2024-04-19 RX ORDER — OXYCODONE AND ACETAMINOPHEN 10; 325 MG/1; MG/1
1 TABLET ORAL EVERY 6 HOURS PRN
COMMUNITY
Start: 2024-03-28

## 2024-04-19 ASSESSMENT — FIBROSIS 4 INDEX: FIB4 SCORE: 0.55

## 2024-04-19 NOTE — PROGRESS NOTES
"Subjective:   Janine Capellan is a 53 y.o. female who presents for Joint Swelling (Pain, Limited ROM x 3 days ) and Urticaria (All over arms and knees )      HPI:  53-year-old female presents the urgent care for joint pain primarily to the left wrist but also present to the right wrist.  Also has some pain to the bilateral knees.  Denies any trauma or injury.  States that she has this pain even with lightly brushing on objects.  She has noticed some swelling to the left wrist has tried some ibuprofen with mild improvement.  No numbness, tingling, or burning.    Medications:    albuterol Aers  Caplyta Caps  cyclobenzaprine Tabs  diclofenac sodium Gel  estradiol Tabs  HYDROcodone/acetaminophen Tabs  lisinopril Tabs  Myrbetriq Tb24  omeprazole  oxyCODONE-acetaminophen Tabs  predniSONE Tabs  pregabalin Caps  sertraline Tabs  traZODone Tabs  zolpidem Tabs    Allergies: Patient has no known allergies.    Problem List: Janine Capellan does not have any pertinent problems on file.    Surgical History:  Past Surgical History:   Procedure Laterality Date    HYSTERECTOMY LAPAROSCOPY      OTHER ORTHOPEDIC SURGERY      right femur, knee       Past Social Hx: Janine Capellan  reports that she has quit smoking. Her smoking use included cigarettes. She has never used smokeless tobacco. She reports that she does not currently use alcohol. She reports that she does not use drugs.     Past Family Hx:  Janine Capellan family history is not on file.     Problem list, medications, and allergies reviewed by myself today in Epic.     Objective:     BP (!) 138/94   Pulse 83   Temp 37 °C (98.6 °F) (Temporal)   Resp 16   Ht 1.651 m (5' 5\")   Wt 122 kg (270 lb)   LMP 10/24/2009   SpO2 94%   BMI 44.93 kg/m²     Physical Exam  Vitals reviewed.   Cardiovascular:      Rate and Rhythm: Normal rate.      Pulses: Normal pulses.   Pulmonary:      Effort: Pulmonary effort is normal.   Musculoskeletal:      Comments: Left wrist: Tenderness " present to the dorsal aspect.  Mild to moderate amount of swelling present.  No increased warmth or erythema.  Tenderness to the dorsal aspect of the right wrist as well.    Patient does have tenderness to the bilateral knees without swelling or erythema.  Full range of motion of all joints.   Neurological:      Mental Status: She is alert.         Assessment/Plan:     Diagnosis and associated orders:     1. Acute gout of multiple sites, unspecified cause  predniSONE (DELTASONE) 20 MG Tab         Comments/MDM:     Patient's presentation physical exam findings consistent with acute gout of multiple sites.  I do not see any signs of infection at this time.  Vitals are stable.  Afebrile.  Patient replaced on course of prednisone.  May continue Voltaren gel.  No red flag signs.  Return precautions discussed.  Patient is agreeable to the plan.         Differential diagnosis, natural history, supportive care, and indications for immediate follow-up discussed.    Advised the patient to follow-up with the primary care physician for recheck, reevaluation, and consideration of further management.    Please note that this dictation was created using voice recognition software. I have made a reasonable attempt to correct obvious errors, but I expect that there are errors of grammar and possibly content that I did not discover before finalizing the note.    Electronically signed by Francois Marin PA-C.

## 2024-06-04 ENCOUNTER — OFFICE VISIT (OUTPATIENT)
Dept: MEDICAL GROUP | Age: 54
End: 2024-06-04
Payer: MEDICARE

## 2024-06-04 VITALS
BODY MASS INDEX: 42.15 KG/M2 | OXYGEN SATURATION: 97 % | HEIGHT: 65 IN | SYSTOLIC BLOOD PRESSURE: 102 MMHG | HEART RATE: 81 BPM | DIASTOLIC BLOOD PRESSURE: 64 MMHG | WEIGHT: 253 LBS | TEMPERATURE: 97.6 F

## 2024-06-04 DIAGNOSIS — N39.46 MIXED STRESS AND URGE URINARY INCONTINENCE: ICD-10-CM

## 2024-06-04 DIAGNOSIS — M54.50 CHRONIC MIDLINE LOW BACK PAIN WITHOUT SCIATICA: ICD-10-CM

## 2024-06-04 DIAGNOSIS — E66.01 MORBID OBESITY WITH BMI OF 40.0-44.9, ADULT (HCC): ICD-10-CM

## 2024-06-04 DIAGNOSIS — N81.9 FEMALE GENITAL PROLAPSE, UNSPECIFIED TYPE: ICD-10-CM

## 2024-06-04 DIAGNOSIS — F33.3 DEPRESSION, MAJOR, RECURRENT, SEVERE WITH PSYCHOSIS (HCC): ICD-10-CM

## 2024-06-04 DIAGNOSIS — K21.9 GASTROESOPHAGEAL REFLUX DISEASE WITHOUT ESOPHAGITIS: ICD-10-CM

## 2024-06-04 DIAGNOSIS — Z23 NEED FOR VACCINATION: ICD-10-CM

## 2024-06-04 DIAGNOSIS — G89.29 CHRONIC MIDLINE LOW BACK PAIN WITHOUT SCIATICA: ICD-10-CM

## 2024-06-04 DIAGNOSIS — Z11.59 NEED FOR HEPATITIS C SCREENING TEST: ICD-10-CM

## 2024-06-04 DIAGNOSIS — Z12.31 ENCOUNTER FOR SCREENING MAMMOGRAM FOR BREAST CANCER: ICD-10-CM

## 2024-06-04 DIAGNOSIS — Z12.11 COLON CANCER SCREENING: ICD-10-CM

## 2024-06-04 DIAGNOSIS — Z11.4 SCREENING FOR HIV (HUMAN IMMUNODEFICIENCY VIRUS): ICD-10-CM

## 2024-06-04 DIAGNOSIS — F20.89 OTHER SCHIZOPHRENIA (HCC): ICD-10-CM

## 2024-06-04 DIAGNOSIS — M17.0 OSTEOARTHRITIS OF BOTH KNEES, UNSPECIFIED OSTEOARTHRITIS TYPE: ICD-10-CM

## 2024-06-04 DIAGNOSIS — Z00.00 BLOOD TESTS FOR ROUTINE GENERAL PHYSICAL EXAMINATION: ICD-10-CM

## 2024-06-04 PROBLEM — J96.00 ACUTE RESPIRATORY FAILURE (HCC): Status: RESOLVED | Noted: 2023-07-02 | Resolved: 2024-06-04

## 2024-06-04 PROBLEM — A41.9 SEPSIS (HCC): Status: RESOLVED | Noted: 2023-07-02 | Resolved: 2024-06-04

## 2024-06-04 PROBLEM — J18.9 PNEUMONIA: Status: RESOLVED | Noted: 2023-07-02 | Resolved: 2024-06-04

## 2024-06-04 PROCEDURE — 3078F DIAST BP <80 MM HG: CPT | Performed by: STUDENT IN AN ORGANIZED HEALTH CARE EDUCATION/TRAINING PROGRAM

## 2024-06-04 PROCEDURE — 3074F SYST BP LT 130 MM HG: CPT | Performed by: STUDENT IN AN ORGANIZED HEALTH CARE EDUCATION/TRAINING PROGRAM

## 2024-06-04 PROCEDURE — 99214 OFFICE O/P EST MOD 30 MIN: CPT | Performed by: STUDENT IN AN ORGANIZED HEALTH CARE EDUCATION/TRAINING PROGRAM

## 2024-06-04 RX ORDER — CHLORHEXIDINE GLUCONATE ORAL RINSE 1.2 MG/ML
15 SOLUTION DENTAL 2 TIMES DAILY
COMMUNITY
Start: 2024-03-23

## 2024-06-04 ASSESSMENT — ENCOUNTER SYMPTOMS
BLOOD IN STOOL: 0
DOUBLE VISION: 0
CHILLS: 0
ABDOMINAL PAIN: 0
ORTHOPNEA: 0
BACK PAIN: 0
WHEEZING: 0
PALPITATIONS: 0
FEVER: 0
SHORTNESS OF BREATH: 0
BRUISES/BLEEDS EASILY: 0
MYALGIAS: 0
WEAKNESS: 0
HEADACHES: 0
DEPRESSION: 0
BLURRED VISION: 0
DIZZINESS: 0
COUGH: 0

## 2024-06-04 ASSESSMENT — PATIENT HEALTH QUESTIONNAIRE - PHQ9
8. MOVING OR SPEAKING SO SLOWLY THAT OTHER PEOPLE COULD HAVE NOTICED. OR THE OPPOSITE, BEING SO FIGETY OR RESTLESS THAT YOU HAVE BEEN MOVING AROUND A LOT MORE THAN USUAL: NOT AT ALL
SUM OF ALL RESPONSES TO PHQ9 QUESTIONS 1 AND 2: 0
9. THOUGHTS THAT YOU WOULD BE BETTER OFF DEAD, OR OF HURTING YOURSELF: NOT AT ALL
7. TROUBLE CONCENTRATING ON THINGS, SUCH AS READING THE NEWSPAPER OR WATCHING TELEVISION: NOT AT ALL
2. FEELING DOWN, DEPRESSED, IRRITABLE, OR HOPELESS: NOT AT ALL
6. FEELING BAD ABOUT YOURSELF - OR THAT YOU ARE A FAILURE OR HAVE LET YOURSELF OR YOUR FAMILY DOWN: NOT AL ALL
3. TROUBLE FALLING OR STAYING ASLEEP OR SLEEPING TOO MUCH: NOT AT ALL
SUM OF ALL RESPONSES TO PHQ QUESTIONS 1-9: 0
1. LITTLE INTEREST OR PLEASURE IN DOING THINGS: NOT AT ALL
4. FEELING TIRED OR HAVING LITTLE ENERGY: NOT AT ALL
5. POOR APPETITE OR OVEREATING: NOT AT ALL

## 2024-06-04 ASSESSMENT — FIBROSIS 4 INDEX: FIB4 SCORE: 0.55

## 2024-06-04 NOTE — PATIENT INSTRUCTIONS
-Continue home medications  -Requested medical records  -Follow-up on referrals  -Fasting lab work  -Follow-up with me in 2 weeks
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
## 2024-06-14 ENCOUNTER — OFFICE VISIT (OUTPATIENT)
Dept: PHYSICAL MEDICINE AND REHAB | Facility: MEDICAL CENTER | Age: 54
End: 2024-06-14
Payer: MEDICARE

## 2024-06-14 ENCOUNTER — TELEPHONE (OUTPATIENT)
Dept: SPORTS MEDICINE | Facility: OTHER | Age: 54
End: 2024-06-14

## 2024-06-14 VITALS
OXYGEN SATURATION: 94 % | HEART RATE: 74 BPM | WEIGHT: 246.25 LBS | DIASTOLIC BLOOD PRESSURE: 90 MMHG | SYSTOLIC BLOOD PRESSURE: 130 MMHG | TEMPERATURE: 98.4 F | BODY MASS INDEX: 41.03 KG/M2 | HEIGHT: 65 IN

## 2024-06-14 DIAGNOSIS — M54.9 DORSALGIA: ICD-10-CM

## 2024-06-14 DIAGNOSIS — G89.29 OTHER CHRONIC PAIN: ICD-10-CM

## 2024-06-14 DIAGNOSIS — M17.2 POST-TRAUMATIC OSTEOARTHRITIS OF BOTH KNEES: ICD-10-CM

## 2024-06-14 DIAGNOSIS — M25.561 CHRONIC PAIN OF BOTH KNEES: ICD-10-CM

## 2024-06-14 DIAGNOSIS — M25.562 CHRONIC PAIN OF BOTH KNEES: ICD-10-CM

## 2024-06-14 DIAGNOSIS — M79.10 MYALGIA: ICD-10-CM

## 2024-06-14 DIAGNOSIS — G89.29 CHRONIC PAIN OF BOTH KNEES: ICD-10-CM

## 2024-06-14 PROCEDURE — 3075F SYST BP GE 130 - 139MM HG: CPT | Performed by: STUDENT IN AN ORGANIZED HEALTH CARE EDUCATION/TRAINING PROGRAM

## 2024-06-14 PROCEDURE — G2211 COMPLEX E/M VISIT ADD ON: HCPCS | Performed by: STUDENT IN AN ORGANIZED HEALTH CARE EDUCATION/TRAINING PROGRAM

## 2024-06-14 PROCEDURE — 3080F DIAST BP >= 90 MM HG: CPT | Performed by: STUDENT IN AN ORGANIZED HEALTH CARE EDUCATION/TRAINING PROGRAM

## 2024-06-14 PROCEDURE — 99204 OFFICE O/P NEW MOD 45 MIN: CPT | Performed by: STUDENT IN AN ORGANIZED HEALTH CARE EDUCATION/TRAINING PROGRAM

## 2024-06-14 PROCEDURE — 1125F AMNT PAIN NOTED PAIN PRSNT: CPT | Performed by: STUDENT IN AN ORGANIZED HEALTH CARE EDUCATION/TRAINING PROGRAM

## 2024-06-14 ASSESSMENT — PATIENT HEALTH QUESTIONNAIRE - PHQ9: CLINICAL INTERPRETATION OF PHQ2 SCORE: 0

## 2024-06-14 ASSESSMENT — FIBROSIS 4 INDEX: FIB4 SCORE: 0.55

## 2024-06-14 ASSESSMENT — PAIN SCALES - GENERAL: PAINLEVEL: 5=MODERATE PAIN

## 2024-06-14 NOTE — Clinical Note
Hi Dr. Hooper's,  Thank you for referring Janine.  It is fine for you to continue Percocet refills at this time.  I will see her back in 3 months to assess progress with PT.  Thanks, Jillian

## 2024-06-21 ENCOUNTER — HOSPITAL ENCOUNTER (OUTPATIENT)
Dept: LAB | Facility: MEDICAL CENTER | Age: 54
End: 2024-06-21
Attending: STUDENT IN AN ORGANIZED HEALTH CARE EDUCATION/TRAINING PROGRAM
Payer: MEDICARE

## 2024-06-21 DIAGNOSIS — Z00.00 BLOOD TESTS FOR ROUTINE GENERAL PHYSICAL EXAMINATION: ICD-10-CM

## 2024-06-21 DIAGNOSIS — Z11.4 SCREENING FOR HIV (HUMAN IMMUNODEFICIENCY VIRUS): ICD-10-CM

## 2024-06-21 DIAGNOSIS — Z11.59 NEED FOR HEPATITIS C SCREENING TEST: ICD-10-CM

## 2024-06-24 ENCOUNTER — OFFICE VISIT (OUTPATIENT)
Dept: MEDICAL GROUP | Age: 54
End: 2024-06-24
Payer: MEDICARE

## 2024-06-24 VITALS
TEMPERATURE: 97.3 F | SYSTOLIC BLOOD PRESSURE: 122 MMHG | BODY MASS INDEX: 41.65 KG/M2 | WEIGHT: 250 LBS | HEART RATE: 83 BPM | OXYGEN SATURATION: 95 % | HEIGHT: 65 IN | DIASTOLIC BLOOD PRESSURE: 66 MMHG

## 2024-06-24 DIAGNOSIS — R73.03 PREDIABETES: ICD-10-CM

## 2024-06-24 DIAGNOSIS — M54.50 CHRONIC MIDLINE LOW BACK PAIN WITHOUT SCIATICA: ICD-10-CM

## 2024-06-24 DIAGNOSIS — G89.29 CHRONIC MIDLINE LOW BACK PAIN WITHOUT SCIATICA: ICD-10-CM

## 2024-06-24 DIAGNOSIS — E66.01 CLASS 3 SEVERE OBESITY DUE TO EXCESS CALORIES WITHOUT SERIOUS COMORBIDITY WITH BODY MASS INDEX (BMI) OF 45.0 TO 49.9 IN ADULT (HCC): ICD-10-CM

## 2024-06-24 DIAGNOSIS — H26.9 CATARACT OF LEFT EYE, UNSPECIFIED CATARACT TYPE: ICD-10-CM

## 2024-06-24 DIAGNOSIS — E87.8 ABNORMAL BLOOD ELECTROLYTE LEVEL: ICD-10-CM

## 2024-06-24 DIAGNOSIS — K21.9 GASTROESOPHAGEAL REFLUX DISEASE WITHOUT ESOPHAGITIS: ICD-10-CM

## 2024-06-24 DIAGNOSIS — E78.5 DYSLIPIDEMIA: ICD-10-CM

## 2024-06-24 DIAGNOSIS — R79.89 ABNORMAL THYROID STIMULATING HORMONE (TSH) LEVEL: ICD-10-CM

## 2024-06-24 DIAGNOSIS — H53.412 SCOTOMA INVOLVING CENTRAL AREA IN VISUAL FIELD OF LEFT EYE: ICD-10-CM

## 2024-06-24 DIAGNOSIS — E55.9 VITAMIN D INSUFFICIENCY: ICD-10-CM

## 2024-06-24 DIAGNOSIS — R79.89 ABNORMAL TSH: ICD-10-CM

## 2024-06-24 DIAGNOSIS — M25.59 PAIN IN OTHER JOINT: ICD-10-CM

## 2024-06-24 DIAGNOSIS — N39.46 MIXED STRESS AND URGE URINARY INCONTINENCE: ICD-10-CM

## 2024-06-24 PROCEDURE — 3078F DIAST BP <80 MM HG: CPT | Performed by: STUDENT IN AN ORGANIZED HEALTH CARE EDUCATION/TRAINING PROGRAM

## 2024-06-24 PROCEDURE — 3074F SYST BP LT 130 MM HG: CPT | Performed by: STUDENT IN AN ORGANIZED HEALTH CARE EDUCATION/TRAINING PROGRAM

## 2024-06-24 PROCEDURE — 99214 OFFICE O/P EST MOD 30 MIN: CPT | Performed by: STUDENT IN AN ORGANIZED HEALTH CARE EDUCATION/TRAINING PROGRAM

## 2024-06-24 RX ORDER — PREGABALIN 75 MG/1
75 CAPSULE ORAL 2 TIMES DAILY
Qty: 60 CAPSULE | Refills: 2 | Status: SHIPPED | OUTPATIENT
Start: 2024-06-24 | End: 2024-09-22

## 2024-06-24 RX ORDER — MIRABEGRON 25 MG/1
25 TABLET, FILM COATED, EXTENDED RELEASE ORAL DAILY
Qty: 30 TABLET | Refills: 3 | Status: SHIPPED | OUTPATIENT
Start: 2024-06-24

## 2024-06-24 RX ORDER — OMEPRAZOLE 40 MG/1
40 CAPSULE, DELAYED RELEASE ORAL EVERY MORNING
Qty: 30 CAPSULE | Refills: 3 | Status: SHIPPED | OUTPATIENT
Start: 2024-06-24

## 2024-06-24 ASSESSMENT — ENCOUNTER SYMPTOMS
BRUISES/BLEEDS EASILY: 0
ORTHOPNEA: 0
HEARTBURN: 0
BACK PAIN: 0
FEVER: 0
SHORTNESS OF BREATH: 0
DIZZINESS: 0
CHILLS: 0
DOUBLE VISION: 0
BLOOD IN STOOL: 0
DEPRESSION: 0
BLURRED VISION: 0
HEADACHES: 0
WEAKNESS: 0
PALPITATIONS: 0
ABDOMINAL PAIN: 0

## 2024-06-24 ASSESSMENT — FIBROSIS 4 INDEX: FIB4 SCORE: 0.55

## 2024-06-24 NOTE — PROGRESS NOTES
"Subjective:     CC: Lab review    HPI:   History of Present Illness  The patient is a 63-year-old female coming for lab review.    The patient's Medicare code necessitates the necessity for blood work. She is currently on a regimen of Lyrica for back pain, knee pain, and hip pain. Additionally, she is on Myrbetriq for bladder spasms. She has an upcoming appointment with a urologist and is contemplating the necessity of Myrbetriq.    Approximately a week ago, the patient experienced vision loss in her left eye. She underwent bilateral cataract surgery, which initially improved her vision. However, last week, she began experiencing a blurry spot in the inner half of her left eye, which has progressively worsened. She does not currently have an ophthalmologist.  I placed new lab orders with CPT codes, since previous orders were declined by Medicare.      ROS:  Review of Systems   Constitutional:  Negative for chills, fever and malaise/fatigue.   HENT:  Negative for nosebleeds and tinnitus.    Eyes:  Negative for blurred vision and double vision.   Respiratory:  Negative for shortness of breath.    Cardiovascular:  Negative for chest pain, palpitations, orthopnea and leg swelling.   Gastrointestinal:  Negative for abdominal pain, blood in stool, heartburn and melena.   Genitourinary:  Negative for frequency and urgency.   Musculoskeletal:  Negative for back pain and joint pain.   Skin:  Negative for rash.   Neurological:  Negative for dizziness, weakness and headaches.   Endo/Heme/Allergies:  Does not bruise/bleed easily.   Psychiatric/Behavioral:  Negative for depression and suicidal ideas.        Objective:     Exam:  /66 (BP Location: Right arm, Patient Position: Sitting, BP Cuff Size: Adult)   Pulse 83   Temp 36.3 °C (97.3 °F) (Temporal)   Ht 1.651 m (5' 5\")   Wt 113 kg (250 lb)   LMP 10/24/2009   SpO2 95%   BMI 41.60 kg/m²  Body mass index is 41.6 kg/m².    Physical Exam  Vitals reviewed. "   Constitutional:       General: She is not in acute distress.  HENT:      Head: Normocephalic and atraumatic.      Mouth/Throat:      Mouth: Mucous membranes are moist.   Eyes:      General: No scleral icterus.     Extraocular Movements: Extraocular movements intact.      Pupils: Pupils are equal, round, and reactive to light.   Cardiovascular:      Rate and Rhythm: Normal rate and regular rhythm.      Pulses: Normal pulses.      Heart sounds: Normal heart sounds. No murmur heard.  Pulmonary:      Effort: Pulmonary effort is normal. No respiratory distress.      Breath sounds: Normal breath sounds. No wheezing.   Abdominal:      General: There is no distension.      Palpations: Abdomen is soft.      Tenderness: There is no abdominal tenderness. There is no guarding or rebound.   Musculoskeletal:      Cervical back: Normal range of motion and neck supple.      Right lower leg: No edema.      Left lower leg: No edema.   Skin:     General: Skin is warm.      Capillary Refill: Capillary refill takes less than 2 seconds.      Coloration: Skin is not jaundiced.   Neurological:      General: No focal deficit present.      Mental Status: She is alert.      Cranial Nerves: No cranial nerve deficit.   Psychiatric:         Mood and Affect: Mood normal.         Behavior: Behavior normal.       Labs: Unable to get labs    Assessment & Plan:     53 y.o. female with the following -     1. 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 Cataract of left eye, unspecified cataract type  -Patient complained today of peripheral visual loss of the left eye, patient unable to see nasal aspect of visual field.  Patient states that this occurred about a week ago while she was camping and has not improved at all or getting worse  -She has past medical history of left eye cataract surgery with placement of lenses.  Patient is worried that the lenses may be displaced causing visual occlusion  -Placed urgent referral to  ophthalmologist  - Referral to Ophthalmology    3. Mixed stress and urge urinary incontinence  -Chronic, stable  -Current taking Myrbetriq  -She has a follow-up appointment with urologist  - mirabegron ER (MYRBETRIQ) 25 MG TABLET SR 24 HR; Take 1 Tablet by mouth every day.  Dispense: 30 Tablet; Refill: 3    4. Class 3 severe obesity due to excess calories without serious comorbidity with body mass index (BMI) of 45.0 to 49.9 in adult (HCC)  5. Prediabetes  -Chronic, stable  -Patient needs lipid profile and hemoglobin A1c    -Lipid Profile; Future  - HEMOGLOBIN A1C; Future    6. Dyslipidemia  -Chronic, stable  -Needs new labs  - Lipid Profile; Future    7. Gastroesophageal reflux disease without esophagitis  -Chronic, stable  -Currently taking omeprazole 40 mg daily  -No significant symptoms of GERD  -Continue same therapy  - omeprazole (PRILOSEC) 40 MG delayed-release capsule; Take 1 Capsule by mouth every morning.  Dispense: 30 Capsule; Refill: 3    8. Vitamin D insufficiency  -History of vitamin D insufficiency  - VITAMIN D,25 HYDROXY (DEFICIENCY); Future    9. Chronic midline low back pain without sciatica  -Chronic pain, currently taking Lyrica  -Recently seen by pain specialist  -No change in therapy were recommended  -Patient will continue on Lyrica and also oxycodone  -Patient will will come for follow-up appointment for controlled substance refills  - pregabalin (LYRICA) 75 MG Cap; Take 1 Capsule by mouth 2 times a day for 90 days.  Dispense: 60 Capsule; Refill: 2    10. Abnormal TSH  -History above  -Repeat TSH    11. Abnormal blood electrolyte level  -History electrolyte balances  -Place orders for metabolic panel  - Comp Metabolic Panel; Future    12. Abnormal thyroid stimulating hormone (TSH) level  -Cipro No. 10    13. Pain in other joint  -Chronic osteoarthritis of multiple joints, however she does have a history of single joint swelling with redness in the past  -Will test for gout  - URIC ACID;  Future       Return in about 5 months (around 11/24/2024) for Annual.    Please note that this dictation was created using voice recognition software. I have made every reasonable attempt to correct obvious errors, but I expect that there are errors of grammar and possibly content that I did not discover before finalizing the note.

## 2024-06-26 ENCOUNTER — HOSPITAL ENCOUNTER (OUTPATIENT)
Dept: LAB | Facility: MEDICAL CENTER | Age: 54
End: 2024-06-26
Attending: STUDENT IN AN ORGANIZED HEALTH CARE EDUCATION/TRAINING PROGRAM
Payer: MEDICARE

## 2024-06-26 DIAGNOSIS — E87.8 ABNORMAL BLOOD ELECTROLYTE LEVEL: ICD-10-CM

## 2024-06-26 DIAGNOSIS — E78.5 DYSLIPIDEMIA: ICD-10-CM

## 2024-06-26 DIAGNOSIS — R73.03 PREDIABETES: ICD-10-CM

## 2024-06-26 DIAGNOSIS — E55.9 VITAMIN D INSUFFICIENCY: ICD-10-CM

## 2024-06-26 DIAGNOSIS — M25.59 PAIN IN OTHER JOINT: ICD-10-CM

## 2024-06-26 DIAGNOSIS — E66.01 CLASS 3 SEVERE OBESITY DUE TO EXCESS CALORIES WITHOUT SERIOUS COMORBIDITY WITH BODY MASS INDEX (BMI) OF 45.0 TO 49.9 IN ADULT (HCC): ICD-10-CM

## 2024-06-26 LAB
25(OH)D3 SERPL-MCNC: 26 NG/ML (ref 30–100)
ALBUMIN SERPL BCP-MCNC: 3.6 G/DL (ref 3.2–4.9)
ALBUMIN/GLOB SERPL: 1.5 G/DL
ALP SERPL-CCNC: 57 U/L (ref 30–99)
ALT SERPL-CCNC: 20 U/L (ref 2–50)
ANION GAP SERPL CALC-SCNC: 12 MMOL/L (ref 7–16)
AST SERPL-CCNC: 23 U/L (ref 12–45)
BILIRUB SERPL-MCNC: <0.2 MG/DL (ref 0.1–1.5)
BUN SERPL-MCNC: 12 MG/DL (ref 8–22)
CALCIUM ALBUM COR SERPL-MCNC: 9.2 MG/DL (ref 8.5–10.5)
CALCIUM SERPL-MCNC: 8.9 MG/DL (ref 8.5–10.5)
CHLORIDE SERPL-SCNC: 105 MMOL/L (ref 96–112)
CHOLEST SERPL-MCNC: 298 MG/DL (ref 100–199)
CO2 SERPL-SCNC: 23 MMOL/L (ref 20–33)
CREAT SERPL-MCNC: 0.53 MG/DL (ref 0.5–1.4)
EST. AVERAGE GLUCOSE BLD GHB EST-MCNC: 117 MG/DL
FASTING STATUS PATIENT QL REPORTED: NORMAL
GFR SERPLBLD CREATININE-BSD FMLA CKD-EPI: 111 ML/MIN/1.73 M 2
GLOBULIN SER CALC-MCNC: 2.4 G/DL (ref 1.9–3.5)
GLUCOSE SERPL-MCNC: 86 MG/DL (ref 65–99)
HBA1C MFR BLD: 5.7 % (ref 4–5.6)
HDLC SERPL-MCNC: 48 MG/DL
LDLC SERPL CALC-MCNC: ABNORMAL MG/DL
POTASSIUM SERPL-SCNC: 5 MMOL/L (ref 3.6–5.5)
PROT SERPL-MCNC: 6 G/DL (ref 6–8.2)
SODIUM SERPL-SCNC: 140 MMOL/L (ref 135–145)
TRIGL SERPL-MCNC: 583 MG/DL (ref 0–149)
URATE SERPL-MCNC: 5.1 MG/DL (ref 1.9–8.2)

## 2024-06-26 PROCEDURE — 80061 LIPID PANEL: CPT

## 2024-06-26 PROCEDURE — 84550 ASSAY OF BLOOD/URIC ACID: CPT

## 2024-06-26 PROCEDURE — 83036 HEMOGLOBIN GLYCOSYLATED A1C: CPT | Mod: GA

## 2024-06-26 PROCEDURE — 80053 COMPREHEN METABOLIC PANEL: CPT

## 2024-06-26 PROCEDURE — 36415 COLL VENOUS BLD VENIPUNCTURE: CPT

## 2024-06-26 PROCEDURE — 82306 VITAMIN D 25 HYDROXY: CPT

## 2024-07-03 ENCOUNTER — APPOINTMENT (OUTPATIENT)
Dept: RADIOLOGY | Facility: MEDICAL CENTER | Age: 54
End: 2024-07-03
Attending: STUDENT IN AN ORGANIZED HEALTH CARE EDUCATION/TRAINING PROGRAM
Payer: MEDICARE

## 2024-07-08 ENCOUNTER — HOSPITAL ENCOUNTER (OUTPATIENT)
Dept: RADIOLOGY | Facility: MEDICAL CENTER | Age: 54
End: 2024-07-08
Attending: STUDENT IN AN ORGANIZED HEALTH CARE EDUCATION/TRAINING PROGRAM
Payer: MEDICARE

## 2024-07-08 DIAGNOSIS — Z12.31 ENCOUNTER FOR SCREENING MAMMOGRAM FOR BREAST CANCER: ICD-10-CM

## 2024-07-08 PROCEDURE — 77063 BREAST TOMOSYNTHESIS BI: CPT

## 2024-07-11 ENCOUNTER — OFFICE VISIT (OUTPATIENT)
Dept: MEDICAL GROUP | Age: 54
End: 2024-07-11
Payer: MEDICARE

## 2024-07-11 VITALS
HEIGHT: 65 IN | DIASTOLIC BLOOD PRESSURE: 60 MMHG | HEART RATE: 83 BPM | WEIGHT: 248 LBS | OXYGEN SATURATION: 93 % | SYSTOLIC BLOOD PRESSURE: 124 MMHG | TEMPERATURE: 97.5 F | BODY MASS INDEX: 41.32 KG/M2

## 2024-07-11 DIAGNOSIS — G89.4 CHRONIC PAIN DISORDER: ICD-10-CM

## 2024-07-11 DIAGNOSIS — M54.50 CHRONIC MIDLINE LOW BACK PAIN WITHOUT SCIATICA: ICD-10-CM

## 2024-07-11 DIAGNOSIS — H33.012 PARTIAL RECENT RETINAL DETACHMENT OF LEFT EYE WITH SINGLE DEFECT: ICD-10-CM

## 2024-07-11 DIAGNOSIS — G89.29 CHRONIC MIDLINE LOW BACK PAIN WITHOUT SCIATICA: ICD-10-CM

## 2024-07-11 PROCEDURE — 99214 OFFICE O/P EST MOD 30 MIN: CPT | Performed by: STUDENT IN AN ORGANIZED HEALTH CARE EDUCATION/TRAINING PROGRAM

## 2024-07-11 PROCEDURE — 3078F DIAST BP <80 MM HG: CPT | Performed by: STUDENT IN AN ORGANIZED HEALTH CARE EDUCATION/TRAINING PROGRAM

## 2024-07-11 PROCEDURE — 3074F SYST BP LT 130 MM HG: CPT | Performed by: STUDENT IN AN ORGANIZED HEALTH CARE EDUCATION/TRAINING PROGRAM

## 2024-07-11 RX ORDER — OXYCODONE AND ACETAMINOPHEN 10; 325 MG/1; MG/1
1 TABLET ORAL EVERY 6 HOURS PRN
Qty: 120 TABLET | Refills: 0 | Status: SHIPPED | OUTPATIENT
Start: 2024-07-11 | End: 2024-08-10

## 2024-07-11 ASSESSMENT — ENCOUNTER SYMPTOMS
BLURRED VISION: 1
WEAKNESS: 0
ORTHOPNEA: 0
MYALGIAS: 0
BLOOD IN STOOL: 0
DIZZINESS: 0
HEADACHES: 0
PHOTOPHOBIA: 0
CHILLS: 0
BRUISES/BLEEDS EASILY: 0
SHORTNESS OF BREATH: 0
HEARTBURN: 0
PALPITATIONS: 0
COUGH: 0
EYE REDNESS: 1
EYE PAIN: 0
FEVER: 0
BACK PAIN: 1
DOUBLE VISION: 0
DEPRESSION: 0
ABDOMINAL PAIN: 0

## 2024-07-11 ASSESSMENT — FIBROSIS 4 INDEX: FIB4 SCORE: 0.84

## 2024-07-16 ENCOUNTER — OFFICE VISIT (OUTPATIENT)
Dept: UROLOGY | Facility: MEDICAL CENTER | Age: 54
End: 2024-07-16
Payer: MEDICARE

## 2024-07-16 VITALS
OXYGEN SATURATION: 96 % | HEART RATE: 78 BPM | SYSTOLIC BLOOD PRESSURE: 133 MMHG | DIASTOLIC BLOOD PRESSURE: 76 MMHG | WEIGHT: 248 LBS | BODY MASS INDEX: 41.32 KG/M2 | HEIGHT: 65 IN | TEMPERATURE: 98.3 F

## 2024-07-16 DIAGNOSIS — N39.46 MIXED STRESS AND URGE URINARY INCONTINENCE: ICD-10-CM

## 2024-07-16 DIAGNOSIS — N95.8 GENITOURINARY SYNDROME OF MENOPAUSE: ICD-10-CM

## 2024-07-16 DIAGNOSIS — N39.41 URGENCY INCONTINENCE: ICD-10-CM

## 2024-07-16 LAB
APPEARANCE UR: CLEAR
BILIRUB UR STRIP-MCNC: NORMAL MG/DL
COLOR UR AUTO: YELLOW
GLUCOSE UR STRIP.AUTO-MCNC: NORMAL MG/DL
KETONES UR STRIP.AUTO-MCNC: NORMAL MG/DL
LEUKOCYTE ESTERASE UR QL STRIP.AUTO: NORMAL
NITRITE UR QL STRIP.AUTO: NORMAL
PH UR STRIP.AUTO: 6.5 [PH] (ref 5–8)
POC POST-VOID: NORMAL
POC PRE-VOID: 37 ML
PROT UR QL STRIP: NORMAL MG/DL
RBC UR QL AUTO: NORMAL
SP GR UR STRIP.AUTO: 1.02
UROBILINOGEN UR STRIP-MCNC: 0.2 MG/DL

## 2024-07-16 PROCEDURE — 51798 US URINE CAPACITY MEASURE: CPT | Performed by: PHYSICIAN ASSISTANT

## 2024-07-16 PROCEDURE — 3078F DIAST BP <80 MM HG: CPT | Performed by: PHYSICIAN ASSISTANT

## 2024-07-16 PROCEDURE — 99204 OFFICE O/P NEW MOD 45 MIN: CPT | Mod: 25 | Performed by: PHYSICIAN ASSISTANT

## 2024-07-16 PROCEDURE — 3075F SYST BP GE 130 - 139MM HG: CPT | Performed by: PHYSICIAN ASSISTANT

## 2024-07-16 PROCEDURE — 81002 URINALYSIS NONAUTO W/O SCOPE: CPT | Mod: 59 | Performed by: PHYSICIAN ASSISTANT

## 2024-07-16 RX ORDER — ESTRADIOL 0.1 MG/G
CREAM VAGINAL
Qty: 42.5 G | Refills: 3 | Status: SHIPPED | OUTPATIENT
Start: 2024-07-16

## 2024-07-16 RX ORDER — VIBEGRON 75 MG/1
75 TABLET, FILM COATED ORAL DAILY
Qty: 30 TABLET | Refills: 3 | Status: SHIPPED | OUTPATIENT
Start: 2024-07-16

## 2024-07-16 ASSESSMENT — FIBROSIS 4 INDEX: FIB4 SCORE: 0.84

## 2024-07-18 ENCOUNTER — RESEARCH ENCOUNTER (OUTPATIENT)
Dept: RESEARCH | Facility: MEDICAL CENTER | Age: 54
End: 2024-07-18
Payer: MEDICARE

## 2024-07-25 ENCOUNTER — OFFICE VISIT (OUTPATIENT)
Dept: SPORTS MEDICINE | Facility: OTHER | Age: 54
End: 2024-07-25
Payer: MEDICARE

## 2024-07-25 ENCOUNTER — APPOINTMENT (OUTPATIENT)
Dept: RADIOLOGY | Facility: OTHER | Age: 54
End: 2024-07-25
Attending: FAMILY MEDICINE
Payer: MEDICARE

## 2024-07-25 VITALS
RESPIRATION RATE: 18 BRPM | DIASTOLIC BLOOD PRESSURE: 78 MMHG | BODY MASS INDEX: 41.32 KG/M2 | HEART RATE: 86 BPM | TEMPERATURE: 98.3 F | SYSTOLIC BLOOD PRESSURE: 122 MMHG | OXYGEN SATURATION: 92 % | HEIGHT: 65 IN | WEIGHT: 248 LBS

## 2024-07-25 DIAGNOSIS — M17.11 LOCALIZED OSTEOARTHRITIS OF RIGHT KNEE: ICD-10-CM

## 2024-07-25 DIAGNOSIS — G57.81 ENTRAPMENT, SAPHENOUS NERVE, RIGHT: ICD-10-CM

## 2024-07-25 DIAGNOSIS — M76.821 POSTERIOR TIBIAL TENDINITIS OF RIGHT LEG: ICD-10-CM

## 2024-07-25 PROCEDURE — 3074F SYST BP LT 130 MM HG: CPT | Performed by: FAMILY MEDICINE

## 2024-07-25 PROCEDURE — 99213 OFFICE O/P EST LOW 20 MIN: CPT | Performed by: FAMILY MEDICINE

## 2024-07-25 PROCEDURE — 3078F DIAST BP <80 MM HG: CPT | Performed by: FAMILY MEDICINE

## 2024-07-25 PROCEDURE — 73564 X-RAY EXAM KNEE 4 OR MORE: CPT | Mod: TC,FY,RT | Performed by: RADIOLOGY

## 2024-07-25 ASSESSMENT — FIBROSIS 4 INDEX: FIB4 SCORE: 0.84

## 2024-08-12 ENCOUNTER — TELEPHONE (OUTPATIENT)
Dept: MEDICAL GROUP | Age: 54
End: 2024-08-12

## 2024-08-12 ENCOUNTER — TELEMEDICINE (OUTPATIENT)
Dept: MEDICAL GROUP | Age: 54
End: 2024-08-12
Payer: MEDICARE

## 2024-08-12 VITALS — HEIGHT: 65 IN | WEIGHT: 244 LBS | BODY MASS INDEX: 40.65 KG/M2

## 2024-08-12 DIAGNOSIS — E78.5 DYSLIPIDEMIA: ICD-10-CM

## 2024-08-12 DIAGNOSIS — M54.50 CHRONIC MIDLINE LOW BACK PAIN WITHOUT SCIATICA: ICD-10-CM

## 2024-08-12 DIAGNOSIS — G89.4 CHRONIC PAIN DISORDER: ICD-10-CM

## 2024-08-12 DIAGNOSIS — G89.29 CHRONIC MIDLINE LOW BACK PAIN WITHOUT SCIATICA: ICD-10-CM

## 2024-08-12 PROCEDURE — 99214 OFFICE O/P EST MOD 30 MIN: CPT | Performed by: STUDENT IN AN ORGANIZED HEALTH CARE EDUCATION/TRAINING PROGRAM

## 2024-08-12 RX ORDER — ATORVASTATIN CALCIUM 20 MG/1
20 TABLET, FILM COATED ORAL NIGHTLY
Qty: 90 TABLET | Refills: 2 | Status: SHIPPED | OUTPATIENT
Start: 2024-08-12

## 2024-08-12 RX ORDER — OXYCODONE AND ACETAMINOPHEN 10; 325 MG/1; MG/1
1 TABLET ORAL EVERY 6 HOURS PRN
Qty: 120 TABLET | Refills: 0 | Status: SHIPPED | OUTPATIENT
Start: 2024-08-12 | End: 2024-09-11

## 2024-08-12 ASSESSMENT — ENCOUNTER SYMPTOMS
SHORTNESS OF BREATH: 0
DEPRESSION: 0
BLURRED VISION: 0
BACK PAIN: 0
BRUISES/BLEEDS EASILY: 0
COUGH: 0
BLOOD IN STOOL: 0
FEVER: 0
DIZZINESS: 0
DOUBLE VISION: 0
PALPITATIONS: 0
WEAKNESS: 0
CHILLS: 0
WHEEZING: 0
ABDOMINAL PAIN: 0
HEADACHES: 0

## 2024-08-12 ASSESSMENT — FIBROSIS 4 INDEX: FIB4 SCORE: 0.84

## 2024-08-12 NOTE — TELEPHONE ENCOUNTER
VOICEMAIL  1. Caller Name: Janine                        Call Back Number: [phone]    2. Message: Janine called in regarding questions about her daughter's possibly getting fmla paper work filled out, due to her daughter helping and assisting Janine by missing work and taking her to Dr's appointment and such.    3. Patient approves office to leave a detailed voicemail/MyChart message: N\A    Called back Janine, advised if her daughter has paperwork we can try out best to fill out paper work to the best of abilities.

## 2024-08-12 NOTE — PATIENT INSTRUCTIONS
- Continue home meds  - Start Lipitor 20 mg daily  - Follow up for controlled substance refills as scheduled

## 2024-08-12 NOTE — PROGRESS NOTES
Virtual Visit: Established Patient   This visit was conducted via Teams using secure and encrypted videoconferencing technology.   The patient was in their home in the [facility].    The patient's identity was confirmed and verbal consent was obtained for this virtual visit.    Subjective:   CC:   Chief Complaint   Patient presents with    Medication Refill     History of Present Illness  The patient is a 53-year-old female presenting for medication refill.    She is seeking a refill of her oxycodone prescription. During her last visit, she submitted a controlled substance agreement.    During her previous visit, she was advised to resume her atorvastatin regimen. However, she has run out of the medication, with her last dose taken two days ago. She stated that had an old prescription from [place] she can not recall dose.       ROS   Review of Systems   Constitutional:  Negative for chills, fever and malaise/fatigue.   HENT:  Negative for nosebleeds and tinnitus.    Eyes:  Negative for blurred vision and double vision.   Respiratory:  Negative for cough, shortness of breath and wheezing.    Cardiovascular:  Negative for chest pain, palpitations and leg swelling.   Gastrointestinal:  Negative for abdominal pain, blood in stool and melena.   Genitourinary:  Negative for dysuria and urgency.   Musculoskeletal:  Negative for back pain and joint pain.   Skin:  Negative for rash.   Neurological:  Negative for dizziness, weakness and headaches.   Endo/Heme/Allergies:  Does not bruise/bleed easily.   Psychiatric/Behavioral:  Negative for depression and suicidal ideas.         Current medicines (including changes today)  Current Outpatient Medications   Medication Sig Dispense Refill    vibegron (GEMTESA) 75 MG tablet Take 1 Tablet by mouth every day. 30 Tablet 3    estradiol (ESTRACE) 0.1 MG/GM vaginal cream Apply a pea sized amount inside vaginal canal daily 42.5 g 3    omeprazole (PRILOSEC) 40 MG delayed-release capsule  "Take 1 Capsule by mouth every morning. 30 Capsule 3    pregabalin (LYRICA) 75 MG Cap Take 1 Capsule by mouth 2 times a day for 90 days. 60 Capsule 2    chlorhexidine (PERIDEX) 0.12 % Solution Take 15 mL by mouth 2 times a day.      zolpidem (AMBIEN) 10 MG Tab TAKE ONE (1) TABLET BY MOUTH AT BEDTIME, AS NEEDED FOR INSOMNIA. FOR 30 DAYS G47. 00      traZODone (DESYREL) 100 MG Tab Take 100-200 mg by mouth at bedtime.      estradiol (ESTRACE) 1 MG Tab Take 1 mg by mouth at bedtime.      Lumateperone Tosylate (CAPLYTA) 42 MG Cap Take 42 mg by mouth at bedtime.      lisinopril (PRINIVIL) 10 MG Tab Take 10 mg by mouth every day.      cyclobenzaprine (FLEXERIL) 10 mg Tab Take 10 mg by mouth 3 times a day as needed. Indications: Muscle Spasm      diclofenac sodium (VOLTAREN) 1 % Gel Apply  topically 4 times a day as needed (for pain).      sertraline (ZOLOFT) 100 MG Tab Take 200 mg by mouth at bedtime.       No current facility-administered medications for this visit.       Patient Active Problem List    Diagnosis Date Noted    Morbid obesity with BMI of 40.0-44.9, adult ([facility]) 06/04/2024    Other schizophrenia ([facility]) 06/04/2024    Gastroesophageal reflux disease without esophagitis 06/04/2024    Class 3 severe obesity due to excess calories without serious comorbidity with body mass index (BMI) of 45.0 to 49.9 in adult ([facility]) 07/02/2023    Depression, major, recurrent, severe with psychosis ([facility]) 05/04/2015    Tobacco abuse 05/04/2015    Alcohol abuse 05/04/2015    History of drug abuse ([facility]) 05/04/2015        Objective:   Ht 1.651 m (5' 5\")   Wt 111 kg (244 lb) Comment: pt states  LMP 10/24/2009   BMI 40.60 kg/m²     Physical Exam:  Unable to perform, visit was via teams.  Constitutional: Alert, no distress, well-groomed.  Skin: No rashes in visible areas.  Psych: Alert and oriented x3, normal affect and mood.     Assessment and Plan:   The following treatment plan was discussed:     1. Chronic midline low back pain " without sciatica  2. Chronic pain disorder  -Chronic pain disorder   -Patient treated in the past and [place] by Dr. Lynn  -Currently taking Percocet-10 Every 6 hours  -This is the first time with prescribing this medication to the patient so she is agreeable to sign a controlled substance treatment agreement.  She is aware that she needs to come to see me in person at least twice a year and via virtual visits for refills.  -She is aware that I cannot prescribe this medication without a visit  -She needs to reestablish care with pain management given high dose of oxycodone    - oxyCODONE-acetaminophen (PERCOCET-10)  MG Tab; Take 1 Tablet by mouth every 6 hours as needed for Severe Pain for up to 30 days.  Dispense: 120 Tablet; Refill: 0    3. Dyslipidemia  - Mos recent labs triglycerides >500  -Patient was taking in the past Lipitor, however does not really have any milligrams  -After previous visit she was told to continue medication however she ran out of her old prescription  -She will continue with Lipitor 20 mg daily  -I placed new prescription    Follow-up: Return in about 1 month (around 9/12/2024).

## 2024-08-13 ENCOUNTER — TELEPHONE (OUTPATIENT)
Dept: MEDICAL GROUP | Age: 54
End: 2024-08-13
Payer: MEDICARE

## 2024-08-13 NOTE — TELEPHONE ENCOUNTER
Caller Name: Janine   Call Back Number: [phone]    How would the patient prefer to be contacted with a response: Phone call OK to leave a detailed message    Janine called in regards of medication recently prescribed to her yesterday by . She states that she couldn't get her medication due the Rx requiring a prior authorization. She express how she's not sure how a prior auth work.  Therefore informed her that we would receive either the fax or a notification regarding needed prior authorization and would work on it. Also mention that a PA could take from a day to a few days to hear a response from insurance determining if PA was approved or not. Janine would be informed with updates regarding this manner.

## 2024-08-15 ENCOUNTER — TELEPHONE (OUTPATIENT)
Dept: MEDICAL GROUP | Age: 54
End: 2024-08-15
Payer: MEDICARE

## 2024-08-16 DIAGNOSIS — M54.50 CHRONIC MIDLINE LOW BACK PAIN WITHOUT SCIATICA: ICD-10-CM

## 2024-08-16 DIAGNOSIS — G89.29 CHRONIC MIDLINE LOW BACK PAIN WITHOUT SCIATICA: ICD-10-CM

## 2024-08-16 RX ORDER — OXYCODONE HYDROCHLORIDE 10 MG/1
10 TABLET ORAL EVERY 6 HOURS PRN
Qty: 120 TABLET | Refills: 0 | Status: SHIPPED | OUTPATIENT
Start: 2024-08-16 | End: 2024-09-17

## 2024-08-26 ENCOUNTER — PROCEDURE VISIT (OUTPATIENT)
Dept: UROLOGY | Facility: MEDICAL CENTER | Age: 54
End: 2024-08-26
Payer: MEDICARE

## 2024-08-26 DIAGNOSIS — N39.46 MIXED STRESS AND URGE URINARY INCONTINENCE: ICD-10-CM

## 2024-08-26 LAB
APPEARANCE UR: CLEAR
BILIRUB UR STRIP-MCNC: NORMAL MG/DL
COLOR UR AUTO: YELLOW
GLUCOSE UR STRIP.AUTO-MCNC: NORMAL MG/DL
KETONES UR STRIP.AUTO-MCNC: NORMAL MG/DL
LEUKOCYTE ESTERASE UR QL STRIP.AUTO: NORMAL
NITRITE UR QL STRIP.AUTO: NORMAL
PH UR STRIP.AUTO: 7.5 [PH] (ref 5–8)
PROT UR QL STRIP: NORMAL MG/DL
RBC UR QL AUTO: NORMAL
SP GR UR STRIP.AUTO: 1.02
UROBILINOGEN UR STRIP-MCNC: 0.2 MG/DL

## 2024-08-26 PROCEDURE — 51797 INTRAABDOMINAL PRESSURE TEST: CPT | Performed by: STUDENT IN AN ORGANIZED HEALTH CARE EDUCATION/TRAINING PROGRAM

## 2024-08-26 PROCEDURE — 51784 ANAL/URINARY MUSCLE STUDY: CPT | Performed by: STUDENT IN AN ORGANIZED HEALTH CARE EDUCATION/TRAINING PROGRAM

## 2024-08-26 PROCEDURE — 51741 ELECTRO-UROFLOWMETRY FIRST: CPT | Performed by: STUDENT IN AN ORGANIZED HEALTH CARE EDUCATION/TRAINING PROGRAM

## 2024-08-26 PROCEDURE — 51728 CYSTOMETROGRAM W/VP: CPT | Performed by: STUDENT IN AN ORGANIZED HEALTH CARE EDUCATION/TRAINING PROGRAM

## 2024-08-26 PROCEDURE — 81002 URINALYSIS NONAUTO W/O SCOPE: CPT | Performed by: STUDENT IN AN ORGANIZED HEALTH CARE EDUCATION/TRAINING PROGRAM

## 2024-08-26 NOTE — PROGRESS NOTES
Subjective  Janine Capellan is a 53 y.o. female who presents today for urodynamics to evaluate Overactive bladder and urge urinary incontinence. She has noted some improvement with Gemtesa especially overnight she is not having to rush to the bathroom. She has gone from using 7-8 pads per day to 5-6 on a good day. She has not started the vaginal estrogen yet.     No family history on file.    Social History     Socioeconomic History    Marital status: Single     Spouse name: Not on file    Number of children: Not on file    Years of education: Not on file    Highest education level: Not on file   Occupational History    Not on file   Tobacco Use    Smoking status: Former     Current packs/day: 0.25     Types: Cigarettes    Smokeless tobacco: Never   Vaping Use    Vaping status: Every Day    Substances: Nicotine   Substance and Sexual Activity    Alcohol use: Not Currently    Drug use: No    Sexual activity: Not on file   Other Topics Concern    Not on file   Social History Narrative    Not on file     Social Determinants of Health     Financial Resource Strain: Not on file   Food Insecurity: Not on file   Transportation Needs: Not on file   Physical Activity: Not on file   Stress: Not on file   Social Connections: Not on file   Intimate Partner Violence: Not on file   Housing Stability: Not on file       Past Surgical History:   Procedure Laterality Date    VITRECTOMY POSTERIOR Left 06/28/2024    CARPAL TUNNEL RELEASE Right     HYSTERECTOMY LAPAROSCOPY      OTHER ORTHOPEDIC SURGERY      right femur, knee       Past Medical History:   Diagnosis Date    Asthma     GERD (gastroesophageal reflux disease)     Psychiatric disorder        Current Outpatient Medications   Medication Sig Dispense Refill    oxyCODONE immediate release (ROXICODONE) 10 MG immediate release tablet Take 1 Tablet by mouth every 6 hours as needed for Moderate Pain for up to 120 doses. 120 Tablet 0    oxyCODONE-acetaminophen (PERCOCET-10)  MG  Tab Take 1 Tablet by mouth every 6 hours as needed for Severe Pain for up to 30 days. 120 Tablet 0    atorvastatin (LIPITOR) 20 MG Tab Take 1 Tablet by mouth every evening. 90 Tablet 2    vibegron (GEMTESA) 75 MG tablet Take 1 Tablet by mouth every day. 30 Tablet 3    estradiol (ESTRACE) 0.1 MG/GM vaginal cream Apply a pea sized amount inside vaginal canal daily 42.5 g 3    omeprazole (PRILOSEC) 40 MG delayed-release capsule Take 1 Capsule by mouth every morning. 30 Capsule 3    pregabalin (LYRICA) 75 MG Cap Take 1 Capsule by mouth 2 times a day for 90 days. 60 Capsule 2    chlorhexidine (PERIDEX) 0.12 % Solution Take 15 mL by mouth 2 times a day.      zolpidem (AMBIEN) 10 MG Tab TAKE ONE (1) TABLET BY MOUTH AT BEDTIME, AS NEEDED FOR INSOMNIA. FOR 30 DAYS G47. 00      traZODone (DESYREL) 100 MG Tab Take 100-200 mg by mouth at bedtime.      estradiol (ESTRACE) 1 MG Tab Take 1 mg by mouth at bedtime.      Lumateperone Tosylate (CAPLYTA) 42 MG Cap Take 42 mg by mouth at bedtime.      lisinopril (PRINIVIL) 10 MG Tab Take 10 mg by mouth every day.      cyclobenzaprine (FLEXERIL) 10 mg Tab Take 10 mg by mouth 3 times a day as needed. Indications: Muscle Spasm      diclofenac sodium (VOLTAREN) 1 % Gel Apply  topically 4 times a day as needed (for pain).      sertraline (ZOLOFT) 100 MG Tab Take 200 mg by mouth at bedtime.       No current facility-administered medications for this visit.       No Known Allergies    Objective  There were no vitals taken for this visit.  Physical Exam  Constitutional:       Appearance: Normal appearance.   HENT:      Head: Normocephalic and atraumatic.   Pulmonary:      Effort: Pulmonary effort is normal.   Skin:     General: Skin is warm and dry.   Neurological:      General: No focal deficit present.      Mental Status: She is alert.   Psychiatric:         Mood and Affect: Mood normal.         Behavior: Behavior normal.         Labs:     POC UA  Lab Results   Component Value Date/Time     POCCOLOR yellow 08/26/2024 10:28 AM    POCCOLOR Kayce 10/24/2014 02:38 PM    POCAPPEAR clear 08/26/2024 10:28 AM    POCAPPEAR Clear 10/24/2014 02:38 PM    POCLEUKEST neg 08/26/2024 10:28 AM    POCLEUKEST Negative 10/24/2014 02:38 PM    POCNITRITE neg 08/26/2024 10:28 AM    POCNITRITE Negative 10/24/2014 02:38 PM    POCUROBILIGE 0.2 08/26/2024 10:28 AM    POCPROTEIN neg 08/26/2024 10:28 AM    POCPROTEIN Negative 10/24/2014 02:38 PM    POCURPH 7.5 08/26/2024 10:28 AM    POCURPH 5.5 10/24/2014 02:38 PM    POCBLOOD neg 08/26/2024 10:28 AM    POCBLOOD Trace-intact (A) 10/24/2014 02:38 PM    POCSPGRV 1.020 08/26/2024 10:28 AM    POCSPGRV >=1.030 (A) 10/24/2014 02:38 PM    POCKETONES neg 08/26/2024 10:28 AM    POCKETONES 40 (A) 10/24/2014 02:38 PM    POCBILIRUBIN neg 08/26/2024 10:28 AM    POCGLUCUA neg 08/26/2024 10:28 AM    POCGLUCUA Negative 10/24/2014 02:38 PM      A1C  Lab Results   Component Value Date/Time    HBA1C 5.7 (H) 06/26/2024 1259    AVGLUC 117 06/26/2024 1259           Imaging: none    Procedure    Urodynamics     The patient presents for urodynamics test and to develop an optimized plan for long-term bladder management. A timeout was performed prior to starting. The patient was in the seated position.     Urodyamics Report:      A multichannel urodynamics test was performed after the bladder was catheterized and emptied. A filling urethral catheter and a rectal balloon was placed as well as EMG patch electrodes on the perineum. The starting fill rate was 50 ml/min.     Sensation:     -1st sensation: 67 ml     -1st desire: 108 ml     -strong desire: 162 ml     -bladder capacity: 222 ml     Detrusor Function:     Involuntary Detrusor Contractions: observed at bladder capacity    Occur at infused volume of : 225 ml    Peak pressure of IDC: 36 cmH20     Associated with leakage: yes, patient voided fully    Frequency of IDCs: Terminal     Compliance: Normal- less than 10 cmH20 rise in detrusor pressures during  filling.     Sphincteric function:      Provocative maneuvers were performed with an infused volumes of 50, 100, 200 ml.      Stress incontinence noted at: Not observed    Stress induced detrusor overactivity noted at: Not observed     Leakage was not observed    EMG:      Normal guarding reflex no evidence of detrusor sphincter dyssynergia    Voiding Phase:     Voiding was: Involuntary     Voiding occurred at an infused volume of: 225 ml     Pattern: Continuous    Qmax:  38 ml/sec     Peak detrusor pressure associated with voiding (pDetqMax): 30 cmH20    Voided volume: 224 ml    Calculated PVR (infused volume - voided volume): 1 ml     All catheters were removed, the patient tolerated the procedure well.     Assessment  Urodynamics Diagnoses: Detrusor overactivity and urge urinary incontinence, small capacity, early sensation    Plan for bladder management:     Continue Gemtesa  Start Vaginal estrogen  Schedule for cystoscopy with bladder botox injection    Plan    1. Mixed stress and urge urinary incontinence  - POCT Urinalysis

## 2024-08-29 ENCOUNTER — TELEPHONE (OUTPATIENT)
Dept: UROLOGY | Facility: MEDICAL CENTER | Age: 54
End: 2024-08-29
Payer: MEDICARE

## 2024-09-04 ENCOUNTER — TELEPHONE (OUTPATIENT)
Dept: UROLOGY | Facility: MEDICAL CENTER | Age: 54
End: 2024-09-04
Payer: MEDICARE

## 2024-09-04 ENCOUNTER — APPOINTMENT (OUTPATIENT)
Dept: ADMISSIONS | Facility: MEDICAL CENTER | Age: 54
End: 2024-09-04
Attending: STUDENT IN AN ORGANIZED HEALTH CARE EDUCATION/TRAINING PROGRAM
Payer: MEDICARE

## 2024-09-09 ENCOUNTER — OFFICE VISIT (OUTPATIENT)
Dept: SURGICAL ONCOLOGY | Facility: MEDICAL CENTER | Age: 54
End: 2024-09-09
Payer: MEDICARE

## 2024-09-09 VITALS
DIASTOLIC BLOOD PRESSURE: 70 MMHG | HEIGHT: 65 IN | OXYGEN SATURATION: 97 % | BODY MASS INDEX: 41.15 KG/M2 | HEART RATE: 85 BPM | TEMPERATURE: 97.8 F | WEIGHT: 247 LBS | SYSTOLIC BLOOD PRESSURE: 114 MMHG

## 2024-09-09 DIAGNOSIS — M17.11 PRIMARY OSTEOARTHRITIS OF RIGHT KNEE: ICD-10-CM

## 2024-09-09 PROCEDURE — 3074F SYST BP LT 130 MM HG: CPT | Performed by: ORTHOPAEDIC SURGERY

## 2024-09-09 PROCEDURE — 3078F DIAST BP <80 MM HG: CPT | Performed by: ORTHOPAEDIC SURGERY

## 2024-09-09 PROCEDURE — 99204 OFFICE O/P NEW MOD 45 MIN: CPT | Performed by: ORTHOPAEDIC SURGERY

## 2024-09-09 ASSESSMENT — FIBROSIS 4 INDEX: FIB4 SCORE: 0.84

## 2024-09-09 ASSESSMENT — ENCOUNTER SYMPTOMS
CHILLS: 0
MYALGIAS: 0
FEVER: 0
SENSORY CHANGE: 0
SHORTNESS OF BREATH: 0
WEAKNESS: 0
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
## 2024-09-10 ENCOUNTER — TELEMEDICINE (OUTPATIENT)
Dept: MEDICAL GROUP | Age: 54
End: 2024-09-10
Payer: MEDICARE

## 2024-09-10 VITALS — BODY MASS INDEX: 40.65 KG/M2 | HEIGHT: 65 IN | RESPIRATION RATE: 16 BRPM | WEIGHT: 244 LBS

## 2024-09-10 DIAGNOSIS — N32.81 OVERACTIVE BLADDER: ICD-10-CM

## 2024-09-10 DIAGNOSIS — G89.4 CHRONIC PAIN DISORDER: ICD-10-CM

## 2024-09-10 DIAGNOSIS — M54.50 CHRONIC MIDLINE LOW BACK PAIN WITHOUT SCIATICA: ICD-10-CM

## 2024-09-10 DIAGNOSIS — G89.29 CHRONIC MIDLINE LOW BACK PAIN WITHOUT SCIATICA: ICD-10-CM

## 2024-09-10 DIAGNOSIS — E78.2 MIXED DYSLIPIDEMIA: ICD-10-CM

## 2024-09-10 PROCEDURE — 99214 OFFICE O/P EST MOD 30 MIN: CPT | Performed by: STUDENT IN AN ORGANIZED HEALTH CARE EDUCATION/TRAINING PROGRAM

## 2024-09-10 RX ORDER — PREGABALIN 75 MG/1
75 CAPSULE ORAL 2 TIMES DAILY
Qty: 60 CAPSULE | Refills: 2 | Status: SHIPPED | OUTPATIENT
Start: 2024-09-10 | End: 2024-12-09

## 2024-09-10 RX ORDER — OXYCODONE HYDROCHLORIDE 10 MG/1
10 TABLET ORAL EVERY 6 HOURS PRN
Qty: 120 TABLET | Refills: 0 | Status: SHIPPED | OUTPATIENT
Start: 2024-09-10 | End: 2024-10-12

## 2024-09-10 ASSESSMENT — ENCOUNTER SYMPTOMS
HEARTBURN: 0
BACK PAIN: 1
DOUBLE VISION: 0
HEADACHES: 0
BLURRED VISION: 0
WEAKNESS: 0
BLOOD IN STOOL: 0
CHILLS: 0
DEPRESSION: 0
BRUISES/BLEEDS EASILY: 0
SHORTNESS OF BREATH: 0
PALPITATIONS: 0
COUGH: 0
DIZZINESS: 0
FEVER: 0

## 2024-09-10 ASSESSMENT — FIBROSIS 4 INDEX: FIB4 SCORE: 0.84

## 2024-09-10 NOTE — PATIENT INSTRUCTIONS
-Continue home medications  -Schedule appointment in a month for controlled substance med refills  -Repeat lipid profile in 2 months  -Follow-up with urologist

## 2024-09-12 ENCOUNTER — APPOINTMENT (OUTPATIENT)
Dept: SPORTS MEDICINE | Facility: OTHER | Age: 54
End: 2024-09-12
Payer: MEDICARE

## 2024-09-12 VITALS
BODY MASS INDEX: 40.65 KG/M2 | HEIGHT: 65 IN | RESPIRATION RATE: 18 BRPM | OXYGEN SATURATION: 95 % | TEMPERATURE: 97.2 F | DIASTOLIC BLOOD PRESSURE: 80 MMHG | HEART RATE: 92 BPM | SYSTOLIC BLOOD PRESSURE: 118 MMHG | WEIGHT: 244 LBS

## 2024-09-12 DIAGNOSIS — M17.11 LOCALIZED OSTEOARTHRITIS OF RIGHT KNEE: ICD-10-CM

## 2024-09-12 DIAGNOSIS — M76.61 ACHILLES TENDINITIS, RIGHT LEG: ICD-10-CM

## 2024-09-12 DIAGNOSIS — M72.2 PLANTAR FASCIITIS, RIGHT: ICD-10-CM

## 2024-09-12 DIAGNOSIS — M76.821 POSTERIOR TIBIAL TENDINITIS OF RIGHT LEG: ICD-10-CM

## 2024-09-12 DIAGNOSIS — G57.81 ENTRAPMENT, SAPHENOUS NERVE, RIGHT: ICD-10-CM

## 2024-09-12 PROCEDURE — 99214 OFFICE O/P EST MOD 30 MIN: CPT | Performed by: FAMILY MEDICINE

## 2024-09-12 PROCEDURE — 3074F SYST BP LT 130 MM HG: CPT | Performed by: FAMILY MEDICINE

## 2024-09-12 PROCEDURE — 3079F DIAST BP 80-89 MM HG: CPT | Performed by: FAMILY MEDICINE

## 2024-09-12 ASSESSMENT — FIBROSIS 4 INDEX: FIB4 SCORE: 0.84
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
## 2024-09-18 RX ORDER — ZOLPIDEM TARTRATE 10 MG/1
TABLET ORAL
Qty: 30 TABLET | OUTPATIENT
Start: 2024-09-18

## 2024-09-18 NOTE — TELEPHONE ENCOUNTER
Received request via: Patient    Was the patient seen in the last year in this department? Yes    Does the patient have an active prescription (recently filled or refills available) for medication(s) requested? No    Pharmacy Name: [facility]     Does the patient have [facility] and need 100-day supply? (This applies to ALL medications) Patient does not have SCP    Patient requesting refill for ZOLPIDEM  via [employer]

## 2024-09-23 ENCOUNTER — PRE-ADMISSION TESTING (OUTPATIENT)
Dept: ADMISSIONS | Facility: MEDICAL CENTER | Age: 54
End: 2024-09-23
Attending: STUDENT IN AN ORGANIZED HEALTH CARE EDUCATION/TRAINING PROGRAM
Payer: MEDICARE

## 2024-09-23 DIAGNOSIS — Z01.812 PRE-OPERATIVE LABORATORY EXAMINATION: ICD-10-CM

## 2024-09-23 LAB
APPEARANCE UR: CLEAR
BILIRUB UR QL STRIP.AUTO: NEGATIVE
COLOR UR: YELLOW
GLUCOSE UR STRIP.AUTO-MCNC: NEGATIVE MG/DL
KETONES UR STRIP.AUTO-MCNC: NEGATIVE MG/DL
LEUKOCYTE ESTERASE UR QL STRIP.AUTO: NEGATIVE
MICRO URNS: NORMAL
NITRITE UR QL STRIP.AUTO: NEGATIVE
PH UR STRIP.AUTO: 7 [PH] (ref 5–8)
PROT UR QL STRIP: NEGATIVE MG/DL
RBC UR QL AUTO: NEGATIVE
SP GR UR STRIP.AUTO: 1.02
UROBILINOGEN UR STRIP.AUTO-MCNC: 1 MG/DL

## 2024-09-23 PROCEDURE — 87086 URINE CULTURE/COLONY COUNT: CPT

## 2024-09-23 PROCEDURE — 81003 URINALYSIS AUTO W/O SCOPE: CPT

## 2024-09-24 ENCOUNTER — ANESTHESIA (OUTPATIENT)
Dept: SURGERY | Facility: MEDICAL CENTER | Age: 54
End: 2024-09-24
Payer: MEDICARE

## 2024-09-24 ENCOUNTER — HOSPITAL ENCOUNTER (OUTPATIENT)
Facility: MEDICAL CENTER | Age: 54
End: 2024-09-24
Attending: STUDENT IN AN ORGANIZED HEALTH CARE EDUCATION/TRAINING PROGRAM | Admitting: STUDENT IN AN ORGANIZED HEALTH CARE EDUCATION/TRAINING PROGRAM
Payer: MEDICARE

## 2024-09-24 ENCOUNTER — ANESTHESIA EVENT (OUTPATIENT)
Dept: SURGERY | Facility: MEDICAL CENTER | Age: 54
End: 2024-09-24
Payer: MEDICARE

## 2024-09-24 VITALS
BODY MASS INDEX: 43.2 KG/M2 | HEIGHT: 65 IN | SYSTOLIC BLOOD PRESSURE: 118 MMHG | HEART RATE: 78 BPM | OXYGEN SATURATION: 94 % | TEMPERATURE: 97.5 F | RESPIRATION RATE: 16 BRPM | DIASTOLIC BLOOD PRESSURE: 59 MMHG | WEIGHT: 259.26 LBS

## 2024-09-24 DIAGNOSIS — K21.9 GASTROESOPHAGEAL REFLUX DISEASE WITHOUT ESOPHAGITIS: ICD-10-CM

## 2024-09-24 LAB — EKG IMPRESSION: NORMAL

## 2024-09-24 PROCEDURE — 160002 HCHG RECOVERY MINUTES (STAT): Performed by: STUDENT IN AN ORGANIZED HEALTH CARE EDUCATION/TRAINING PROGRAM

## 2024-09-24 PROCEDURE — 700101 HCHG RX REV CODE 250: Performed by: STUDENT IN AN ORGANIZED HEALTH CARE EDUCATION/TRAINING PROGRAM

## 2024-09-24 PROCEDURE — 160046 HCHG PACU - 1ST 60 MINS PHASE II: Performed by: STUDENT IN AN ORGANIZED HEALTH CARE EDUCATION/TRAINING PROGRAM

## 2024-09-24 PROCEDURE — 52287 CYSTOSCOPY CHEMODENERVATION: CPT | Performed by: STUDENT IN AN ORGANIZED HEALTH CARE EDUCATION/TRAINING PROGRAM

## 2024-09-24 PROCEDURE — 93005 ELECTROCARDIOGRAM TRACING: CPT | Performed by: STUDENT IN AN ORGANIZED HEALTH CARE EDUCATION/TRAINING PROGRAM

## 2024-09-24 PROCEDURE — 700101 HCHG RX REV CODE 250: Performed by: ANESTHESIOLOGY

## 2024-09-24 PROCEDURE — 700105 HCHG RX REV CODE 258: Performed by: STUDENT IN AN ORGANIZED HEALTH CARE EDUCATION/TRAINING PROGRAM

## 2024-09-24 PROCEDURE — C1769 GUIDE WIRE: HCPCS | Performed by: STUDENT IN AN ORGANIZED HEALTH CARE EDUCATION/TRAINING PROGRAM

## 2024-09-24 PROCEDURE — 700111 HCHG RX REV CODE 636 W/ 250 OVERRIDE (IP): Performed by: ANESTHESIOLOGY

## 2024-09-24 PROCEDURE — 160047 HCHG PACU  - EA ADDL 30 MINS PHASE II: Performed by: STUDENT IN AN ORGANIZED HEALTH CARE EDUCATION/TRAINING PROGRAM

## 2024-09-24 PROCEDURE — 160028 HCHG SURGERY MINUTES - 1ST 30 MINS LEVEL 3: Performed by: STUDENT IN AN ORGANIZED HEALTH CARE EDUCATION/TRAINING PROGRAM

## 2024-09-24 PROCEDURE — 700111 HCHG RX REV CODE 636 W/ 250 OVERRIDE (IP): Mod: JZ,JG | Performed by: STUDENT IN AN ORGANIZED HEALTH CARE EDUCATION/TRAINING PROGRAM

## 2024-09-24 PROCEDURE — A9270 NON-COVERED ITEM OR SERVICE: HCPCS | Performed by: ANESTHESIOLOGY

## 2024-09-24 PROCEDURE — 160009 HCHG ANES TIME/MIN: Performed by: STUDENT IN AN ORGANIZED HEALTH CARE EDUCATION/TRAINING PROGRAM

## 2024-09-24 PROCEDURE — 700102 HCHG RX REV CODE 250 W/ 637 OVERRIDE(OP): Performed by: ANESTHESIOLOGY

## 2024-09-24 PROCEDURE — 160048 HCHG OR STATISTICAL LEVEL 1-5: Performed by: STUDENT IN AN ORGANIZED HEALTH CARE EDUCATION/TRAINING PROGRAM

## 2024-09-24 PROCEDURE — 93010 ELECTROCARDIOGRAM REPORT: CPT | Performed by: INTERNAL MEDICINE

## 2024-09-24 PROCEDURE — 160039 HCHG SURGERY MINUTES - EA ADDL 1 MIN LEVEL 3: Performed by: STUDENT IN AN ORGANIZED HEALTH CARE EDUCATION/TRAINING PROGRAM

## 2024-09-24 PROCEDURE — 160025 RECOVERY II MINUTES (STATS): Performed by: STUDENT IN AN ORGANIZED HEALTH CARE EDUCATION/TRAINING PROGRAM

## 2024-09-24 PROCEDURE — 160035 HCHG PACU - 1ST 60 MINS PHASE I: Performed by: STUDENT IN AN ORGANIZED HEALTH CARE EDUCATION/TRAINING PROGRAM

## 2024-09-24 RX ORDER — HALOPERIDOL 5 MG/ML
1 INJECTION INTRAMUSCULAR
Status: DISCONTINUED | OUTPATIENT
Start: 2024-09-24 | End: 2024-09-24 | Stop reason: HOSPADM

## 2024-09-24 RX ORDER — OMEPRAZOLE 40 MG/1
40 CAPSULE, DELAYED RELEASE ORAL EVERY MORNING
Qty: 90 CAPSULE | Refills: 1 | Status: SHIPPED | OUTPATIENT
Start: 2024-09-24

## 2024-09-24 RX ORDER — ONDANSETRON 2 MG/ML
4 INJECTION INTRAMUSCULAR; INTRAVENOUS
Status: DISCONTINUED | OUTPATIENT
Start: 2024-09-24 | End: 2024-09-24 | Stop reason: HOSPADM

## 2024-09-24 RX ORDER — ONDANSETRON 2 MG/ML
INJECTION INTRAMUSCULAR; INTRAVENOUS PRN
Status: DISCONTINUED | OUTPATIENT
Start: 2024-09-24 | End: 2024-09-24 | Stop reason: SURG

## 2024-09-24 RX ORDER — DIPHENHYDRAMINE HYDROCHLORIDE 50 MG/ML
12.5 INJECTION INTRAMUSCULAR; INTRAVENOUS
Status: DISCONTINUED | OUTPATIENT
Start: 2024-09-24 | End: 2024-09-24 | Stop reason: HOSPADM

## 2024-09-24 RX ORDER — DEXAMETHASONE SODIUM PHOSPHATE 4 MG/ML
INJECTION, SOLUTION INTRA-ARTICULAR; INTRALESIONAL; INTRAMUSCULAR; INTRAVENOUS; SOFT TISSUE PRN
Status: DISCONTINUED | OUTPATIENT
Start: 2024-09-24 | End: 2024-09-24 | Stop reason: SURG

## 2024-09-24 RX ORDER — PHENYLEPHRINE HYDROCHLORIDE 10 MG/ML
INJECTION, SOLUTION INTRAMUSCULAR; INTRAVENOUS; SUBCUTANEOUS PRN
Status: DISCONTINUED | OUTPATIENT
Start: 2024-09-24 | End: 2024-09-24 | Stop reason: SURG

## 2024-09-24 RX ORDER — MIDAZOLAM HYDROCHLORIDE 1 MG/ML
INJECTION INTRAMUSCULAR; INTRAVENOUS PRN
Status: DISCONTINUED | OUTPATIENT
Start: 2024-09-24 | End: 2024-09-24 | Stop reason: SURG

## 2024-09-24 RX ORDER — EPHEDRINE SULFATE 50 MG/ML
INJECTION, SOLUTION INTRAVENOUS PRN
Status: DISCONTINUED | OUTPATIENT
Start: 2024-09-24 | End: 2024-09-24 | Stop reason: SURG

## 2024-09-24 RX ORDER — SODIUM CHLORIDE, SODIUM LACTATE, POTASSIUM CHLORIDE, CALCIUM CHLORIDE 600; 310; 30; 20 MG/100ML; MG/100ML; MG/100ML; MG/100ML
INJECTION, SOLUTION INTRAVENOUS CONTINUOUS
Status: ACTIVE | OUTPATIENT
Start: 2024-09-24 | End: 2024-09-24

## 2024-09-24 RX ORDER — ACETAMINOPHEN 500 MG
1000 TABLET ORAL ONCE
Status: COMPLETED | OUTPATIENT
Start: 2024-09-24 | End: 2024-09-24

## 2024-09-24 RX ORDER — CEFAZOLIN SODIUM 1 G/3ML
INJECTION, POWDER, FOR SOLUTION INTRAMUSCULAR; INTRAVENOUS PRN
Status: DISCONTINUED | OUTPATIENT
Start: 2024-09-24 | End: 2024-09-24 | Stop reason: SURG

## 2024-09-24 RX ADMIN — EPHEDRINE SULFATE 5 MG: 50 INJECTION, SOLUTION INTRAVENOUS at 08:04

## 2024-09-24 RX ADMIN — SODIUM CHLORIDE, POTASSIUM CHLORIDE, SODIUM LACTATE AND CALCIUM CHLORIDE: 600; 310; 30; 20 INJECTION, SOLUTION INTRAVENOUS at 07:35

## 2024-09-24 RX ADMIN — LIDOCAINE HYDROCHLORIDE 0.5 ML: 10 INJECTION, SOLUTION EPIDURAL; INFILTRATION; INTRACAUDAL at 06:39

## 2024-09-24 RX ADMIN — DEXAMETHASONE SODIUM PHOSPHATE 8 MG: 4 INJECTION INTRA-ARTICULAR; INTRALESIONAL; INTRAMUSCULAR; INTRAVENOUS; SOFT TISSUE at 07:45

## 2024-09-24 RX ADMIN — ONDANSETRON 4 MG: 2 INJECTION INTRAMUSCULAR; INTRAVENOUS at 07:42

## 2024-09-24 RX ADMIN — EPHEDRINE SULFATE 5 MG: 50 INJECTION, SOLUTION INTRAVENOUS at 07:50

## 2024-09-24 RX ADMIN — PROPOFOL 150 MG: 10 INJECTION, EMULSION INTRAVENOUS at 07:40

## 2024-09-24 RX ADMIN — ACETAMINOPHEN 1000 MG: 500 TABLET ORAL at 07:06

## 2024-09-24 RX ADMIN — FENTANYL CITRATE 50 MCG: 50 INJECTION, SOLUTION INTRAMUSCULAR; INTRAVENOUS at 07:50

## 2024-09-24 RX ADMIN — SODIUM CHLORIDE, POTASSIUM CHLORIDE, SODIUM LACTATE AND CALCIUM CHLORIDE: 600; 310; 30; 20 INJECTION, SOLUTION INTRAVENOUS at 06:41

## 2024-09-24 RX ADMIN — FENTANYL CITRATE 50 MCG: 50 INJECTION, SOLUTION INTRAMUSCULAR; INTRAVENOUS at 07:40

## 2024-09-24 RX ADMIN — MIDAZOLAM HYDROCHLORIDE 2 MG: 2 INJECTION, SOLUTION INTRAMUSCULAR; INTRAVENOUS at 07:36

## 2024-09-24 RX ADMIN — CEFAZOLIN 2 G: 1 INJECTION, POWDER, FOR SOLUTION INTRAMUSCULAR; INTRAVENOUS at 07:48

## 2024-09-24 RX ADMIN — PHENYLEPHRINE HYDROCHLORIDE 200 MCG: 10 INJECTION INTRAVENOUS at 07:51

## 2024-09-24 RX ADMIN — PHENYLEPHRINE HYDROCHLORIDE 100 MCG: 10 INJECTION INTRAVENOUS at 07:47

## 2024-09-24 RX ADMIN — EPHEDRINE SULFATE 5 MG: 50 INJECTION, SOLUTION INTRAVENOUS at 07:56

## 2024-09-24 RX ADMIN — PHENYLEPHRINE HYDROCHLORIDE 100 MCG: 10 INJECTION INTRAVENOUS at 07:59

## 2024-09-24 RX ADMIN — PHENYLEPHRINE HYDROCHLORIDE 200 MCG: 10 INJECTION INTRAVENOUS at 07:55

## 2024-09-24 RX ADMIN — EPHEDRINE SULFATE 10 MG: 50 INJECTION, SOLUTION INTRAVENOUS at 08:00

## 2024-09-24 ASSESSMENT — PAIN DESCRIPTION - PAIN TYPE
TYPE: SURGICAL PAIN

## 2024-09-24 ASSESSMENT — FIBROSIS 4 INDEX: FIB4 SCORE: 0.85

## 2024-09-24 ASSESSMENT — PAIN SCALES - GENERAL: PAIN_LEVEL: 2

## 2024-09-24 NOTE — OR NURSING
0810 - Pt to PACU from OR. Report from anesthesia and OR RN. On 8L O2 via mask. Respirations even and unlabored. VSS. No dressings noted. Oral airway d/c'd upon arrival as pt woke up. Pt back to sleep.    0830 - Pt denies pain or nausea. Back to sleep.     0840 - Pt placed on 2L NC while sleeping.    0845 - Pt tolerating water, denies pain or nausea. Back to sleep.    0930 - Pt daughter updated by telephone.    0945 - Pt to bathroom to void and get dressed. Small amount of blood noted in toiled. Pt voided successfully.     1030 - Pt O2 sat decreases on room air. Pt using IS and O2 replaced.     1145 - Pt daughter at bedside.     1216 - Pt able to keep SpO2 between 90-94%. States readiness for discharge. Discharge instructions reviewed with and signed by Leslee pt daughter. All questions answered and verbalized understanding. Pt wheeled to car via wheelchair by CNA. PIV removed. NAD. VSS. Belongings with patient.

## 2024-09-24 NOTE — DISCHARGE INSTRUCTIONS
If any questions arise, call your provider.  If your provider is not available, please feel free to call the Surgical Center at [phone].    MEDICATIONS: Resume taking daily medication.  Take prescribed pain medication with food.  If no medication is prescribed, you may take non-aspirin pain medication if needed.  PAIN MEDICATION CAN BE VERY CONSTIPATING.  Take a stool softener or laxative such as senokot, pericolace, or milk of magnesia if needed.    You were given tylenol at 0706. You may take an additional dose after 1:06 if needed.    What to Expect Post Anesthesia    Rest and take it easy for the first 24 hours.  A responsible adult is recommended to remain with you during that time.  It is normal to feel sleepy.  We encourage you to not do anything that requires balance, judgment or coordination.    FOR 24 HOURS DO NOT:  Drive, operate machinery or run household appliances.  Drink beer or alcoholic beverages.  Make important decisions or sign legal documents.    To avoid nausea, slowly advance diet as tolerated, avoiding spicy or greasy foods for the first day.  Add more substantial food to your diet according to your provider's instructions.  Babies can be fed formula or breast milk as soon as they are hungry.  INCREASE FLUIDS AND FIBER TO AVOID CONSTIPATION.    MILD FLU-LIKE SYMPTOMS ARE NORMAL.  YOU MAY EXPERIENCE GENERALIZED MUSCLE ACHES, THROAT IRRITATION, HEADACHE AND/OR SOME NAUSEA.

## 2024-09-24 NOTE — ANESTHESIA TIME REPORT
Anesthesia Start and Stop Event Times       Date Time Event    9/24/2024 0649 Ready for Procedure     0735 Anesthesia Start     0815 Anesthesia Stop          Responsible Staff  09/24/24      Name Role Begin End    Shayan Danielson M.D. Anesth 0735 0815          Overtime Reason:  no overtime (within assigned shift)    Comments:

## 2024-09-24 NOTE — ANESTHESIA PREPROCEDURE EVALUATION
Case: [id number] Date/Time: 09/24/24 0715    Procedures:       CYSTOSCOPY WITH BLADDER INJECTION OF ONABOTULINUMOTOXIN      INJECTION, BOTULINUM TOXIN    Pre-op diagnosis: OVERACTIVE BLADDER, URGE INCONTINENCE    Location: [facility] ROOM 25 / SURGERY SAME DAY [facility]    Surgeons: Wilfred Chow M.D.            Relevant Problems   GI   (positive) Gastroesophageal reflux disease without esophagitis       Physical Exam    Airway   Mallampati: II  TM distance: >3 FB  Neck ROM: full       Cardiovascular - normal exam  Rhythm: regular  Rate: normal  (-) murmur     Dental - normal exam           Pulmonary - normal exam  Breath sounds clear to auscultation     Abdominal   (+) obese     Neurological - normal exam                   Anesthesia Plan    ASA 3   ASA physical status 3 criteria: morbid obesity - BMI greater than or equal to 40    Plan - general       Airway plan will be LMA          Induction: intravenous    Postoperative Plan: Postoperative administration of opioids is intended.    Pertinent diagnostic labs and testing reviewed    Informed Consent:    Anesthetic plan and risks discussed with patient.    Use of blood products discussed with: patient whom consented to blood products.            Vit D3  2000 units a day

## 2024-09-24 NOTE — ANESTHESIA PROCEDURE NOTES
Airway    Date/Time: 9/24/2024 7:41 AM    Performed by: Shayan Danielson M.D.  Authorized by: Shayan Danielson M.D.    Location:  OR  Urgency:  Elective  Difficult Airway: No    Indications for Airway Management:  Anesthesia      Spontaneous Ventilation: absent    Sedation Level:  Deep  Preoxygenated: Yes    Patient Position:  Sniffing  Final Airway Type:  Supraglottic airway  Final Supraglottic Airway:  Standard LMA    SGA Size:  3  Number of Attempts at Approach:  1

## 2024-09-24 NOTE — ANESTHESIA POSTPROCEDURE EVALUATION
Patient: Janine Capellan    Procedure Summary       Date: 09/24/24 Room / Location: [facility] ROOM 25 / SURGERY SAME DAY [facility]    Anesthesia Start: 0735 Anesthesia Stop: 0815    Procedures:       CYSTOSCOPY WITH BLADDER INJECTION OF ONABOTULINUMOTOXIN (Bladder)      INJECTION, BOTULINUM TOXIN (Bladder) Diagnosis: (OVERACTIVE BLADDER, URGE INCONTINENCE)    Surgeons: Wilfred Chow M.D. Responsible Provider: Shayan Danielson M.D.    Anesthesia Type: general ASA Status: 3            Final Anesthesia Type: general  Last vitals  BP   Blood Pressure: 127/84    Temp   36.4 °C (97.5 °F)    Pulse   81   Resp   18    SpO2   91 %      Anesthesia Post Evaluation    Patient location during evaluation: PACU  Patient participation: complete - patient participated  Level of consciousness: awake and alert  Pain score: 2    Airway patency: patent  Anesthetic complications: no  Cardiovascular status: hemodynamically stable  Respiratory status: acceptable  Hydration status: euvolemic    PONV: none          There were no known notable events for this encounter.     Nurse Pain Score: 0 (NPRS)

## 2024-09-24 NOTE — OP REPORT
SURGEON: Dr. Wilfred Chow     ANESTHESIA: General (general endotracheal tube)     PRE-OPERATIVE DIAGNOSIS: OAB     POST-OPERATIVE DIAGNOSIS: Same     NAME OF PROCEDURE: Cystoscopy, chemodenervation of the bladder with botox injection, 100 units     FINDINGS OF PROCEDURE: 100 u of botox injected in 10x2 grid starting superior and lateral to the right UO, ending superior and lateral to the left UO. Good hemostasis.      EBL: 0 cc     COMPLICATIONS: None     PATIENT CONDITION: stable     INDICATIONS: Janine Capellan is a 54 y.o. female who agreed to above procedure for further management of OAB after complete discussion of risks, benefits, and alternatives (medical management, sacral neuromodulation, etc.).      PROCEDURE:     After informed consent was obtained in the preoperative care unit, the patient was taken to the OR on a stretcher. The patient was properly identified and placed in supine position per OR protocol. The patient was given a prophylactic dose of ancef 2 grams. General (general endotracheal tube) was administered. The patient was then placed in dorsal lithotomy, prepped and draped in a standard sterile fashion.  A timeout was performed with all parties in agreement.      A 22Fr cystoscope was inserted per urethra. A full inspection of the bladder was completed. I did not note any lesions or masses, the ureteral orifices were noted to be in orthotopic position and effluxing clear yellow urine. I then inserted the needle and flushed this to remove air bubbles. I started my first injection superior and lateral to the right ureteral orifice, completing a row of 10 injections and ending superior and lateral to the left ureteral orifice. I injected a total of 2 rows in the manner described above. The bladder was emptied and then filled partially. I inspected all injection sites, none of the sites required cautery. The patient’s bladder was left partially full. They were awoken from anesthesia and  transferred to the PACU in stable condition.      DISPOSITION: The patient will be discharged home with plan for follow-up in 4 weeks with a PVR.

## 2024-09-25 LAB
BACTERIA UR CULT: NORMAL
SIGNIFICANT IND 70042: NORMAL
SITE SITE: NORMAL
SOURCE SOURCE: NORMAL

## 2024-10-01 ENCOUNTER — TELEMEDICINE (OUTPATIENT)
Dept: MEDICAL GROUP | Age: 54
End: 2024-10-01
Payer: MEDICARE

## 2024-10-01 VITALS
DIASTOLIC BLOOD PRESSURE: 82 MMHG | BODY MASS INDEX: 42.65 KG/M2 | HEIGHT: 65 IN | SYSTOLIC BLOOD PRESSURE: 138 MMHG | WEIGHT: 256 LBS

## 2024-10-01 DIAGNOSIS — F51.01 PRIMARY INSOMNIA: ICD-10-CM

## 2024-10-01 DIAGNOSIS — G89.4 CHRONIC PAIN DISORDER: ICD-10-CM

## 2024-10-01 DIAGNOSIS — G89.29 CHRONIC MIDLINE LOW BACK PAIN WITHOUT SCIATICA: ICD-10-CM

## 2024-10-01 DIAGNOSIS — M54.50 CHRONIC MIDLINE LOW BACK PAIN WITHOUT SCIATICA: ICD-10-CM

## 2024-10-01 PROCEDURE — 99214 OFFICE O/P EST MOD 30 MIN: CPT | Performed by: STUDENT IN AN ORGANIZED HEALTH CARE EDUCATION/TRAINING PROGRAM

## 2024-10-01 RX ORDER — ZOLPIDEM TARTRATE 10 MG/1
10 TABLET ORAL NIGHTLY PRN
Qty: 30 TABLET | Refills: 0 | Status: SHIPPED | OUTPATIENT
Start: 2024-10-01 | End: 2024-10-15 | Stop reason: SDUPTHER

## 2024-10-01 RX ORDER — OXYCODONE HYDROCHLORIDE 10 MG/1
10 TABLET ORAL EVERY 6 HOURS PRN
Qty: 120 TABLET | Refills: 0 | Status: SHIPPED | OUTPATIENT
Start: 2024-10-01 | End: 2024-10-29 | Stop reason: SDUPTHER

## 2024-10-01 ASSESSMENT — ENCOUNTER SYMPTOMS
BRUISES/BLEEDS EASILY: 0
BLOOD IN STOOL: 0
HEADACHES: 0
DEPRESSION: 0
DOUBLE VISION: 0
FEVER: 0
BACK PAIN: 1
DIZZINESS: 0
PALPITATIONS: 0
BLURRED VISION: 0
CHILLS: 0
SHORTNESS OF BREATH: 0
ABDOMINAL PAIN: 0
COUGH: 0
INSOMNIA: 1
WEAKNESS: 0

## 2024-10-01 ASSESSMENT — FIBROSIS 4 INDEX: FIB4 SCORE: 0.85

## 2024-10-02 ENCOUNTER — HOSPITAL ENCOUNTER (OUTPATIENT)
Dept: RADIOLOGY | Facility: MEDICAL CENTER | Age: 54
End: 2024-10-02
Attending: ORTHOPAEDIC SURGERY
Payer: MEDICARE

## 2024-10-02 PROCEDURE — 73700 CT LOWER EXTREMITY W/O DYE: CPT | Mod: RT

## 2024-10-08 ENCOUNTER — APPOINTMENT (OUTPATIENT)
Dept: PHYSICAL MEDICINE AND REHAB | Facility: MEDICAL CENTER | Age: 54
End: 2024-10-08
Payer: MEDICARE

## 2024-10-08 ENCOUNTER — OFFICE VISIT (OUTPATIENT)
Dept: BEHAVIORAL HEALTH | Facility: CLINIC | Age: 54
End: 2024-10-08
Payer: MEDICARE

## 2024-10-08 VITALS
OXYGEN SATURATION: 93 % | WEIGHT: 253.31 LBS | TEMPERATURE: 97.2 F | HEART RATE: 78 BPM | SYSTOLIC BLOOD PRESSURE: 118 MMHG | DIASTOLIC BLOOD PRESSURE: 72 MMHG | BODY MASS INDEX: 42.2 KG/M2 | HEIGHT: 65 IN

## 2024-10-08 DIAGNOSIS — M17.2 POST-TRAUMATIC OSTEOARTHRITIS OF BOTH KNEES: ICD-10-CM

## 2024-10-08 DIAGNOSIS — M25.562 CHRONIC PAIN OF BOTH KNEES: ICD-10-CM

## 2024-10-08 DIAGNOSIS — F43.23 ADJUSTMENT DISORDER WITH MIXED ANXIETY AND DEPRESSED MOOD: ICD-10-CM

## 2024-10-08 DIAGNOSIS — G89.29 CHRONIC PAIN OF BOTH KNEES: ICD-10-CM

## 2024-10-08 DIAGNOSIS — Z13.31 POSITIVE DEPRESSION SCREENING: ICD-10-CM

## 2024-10-08 DIAGNOSIS — M79.10 MYALGIA: ICD-10-CM

## 2024-10-08 DIAGNOSIS — M72.2 PLANTAR FASCIITIS OF RIGHT FOOT: ICD-10-CM

## 2024-10-08 DIAGNOSIS — M54.9 DORSALGIA: ICD-10-CM

## 2024-10-08 DIAGNOSIS — G89.29 OTHER CHRONIC PAIN: ICD-10-CM

## 2024-10-08 DIAGNOSIS — M25.561 CHRONIC PAIN OF BOTH KNEES: ICD-10-CM

## 2024-10-08 PROCEDURE — 99214 OFFICE O/P EST MOD 30 MIN: CPT | Performed by: STUDENT IN AN ORGANIZED HEALTH CARE EDUCATION/TRAINING PROGRAM

## 2024-10-08 PROCEDURE — 3074F SYST BP LT 130 MM HG: CPT | Performed by: STUDENT IN AN ORGANIZED HEALTH CARE EDUCATION/TRAINING PROGRAM

## 2024-10-08 PROCEDURE — 3078F DIAST BP <80 MM HG: CPT | Performed by: STUDENT IN AN ORGANIZED HEALTH CARE EDUCATION/TRAINING PROGRAM

## 2024-10-08 PROCEDURE — 1125F AMNT PAIN NOTED PAIN PRSNT: CPT | Performed by: STUDENT IN AN ORGANIZED HEALTH CARE EDUCATION/TRAINING PROGRAM

## 2024-10-08 PROCEDURE — 90791 PSYCH DIAGNOSTIC EVALUATION: CPT | Performed by: MARRIAGE & FAMILY THERAPIST

## 2024-10-08 PROCEDURE — G2211 COMPLEX E/M VISIT ADD ON: HCPCS | Performed by: STUDENT IN AN ORGANIZED HEALTH CARE EDUCATION/TRAINING PROGRAM

## 2024-10-08 RX ORDER — MELOXICAM 7.5 MG/1
7.5 TABLET ORAL 2 TIMES DAILY
Qty: 60 TABLET | Refills: 2 | Status: SHIPPED | OUTPATIENT
Start: 2024-10-08

## 2024-10-08 ASSESSMENT — FIBROSIS 4 INDEX: FIB4 SCORE: 0.85

## 2024-10-08 ASSESSMENT — PATIENT HEALTH QUESTIONNAIRE - PHQ9
CLINICAL INTERPRETATION OF PHQ2 SCORE: 2
5. POOR APPETITE OR OVEREATING: 2 - MORE THAN HALF THE DAYS
SUM OF ALL RESPONSES TO PHQ QUESTIONS 1-9: 13

## 2024-10-08 ASSESSMENT — PAIN SCALES - GENERAL: PAINLEVEL: 3=SLIGHT PAIN

## 2024-10-10 ENCOUNTER — OFFICE VISIT (OUTPATIENT)
Dept: MEDICAL GROUP | Age: 54
End: 2024-10-10
Payer: MEDICARE

## 2024-10-10 ENCOUNTER — TELEPHONE (OUTPATIENT)
Dept: SURGICAL ONCOLOGY | Facility: MEDICAL CENTER | Age: 54
End: 2024-10-10

## 2024-10-10 ENCOUNTER — APPOINTMENT (OUTPATIENT)
Dept: MEDICAL GROUP | Age: 54
End: 2024-10-10
Payer: MEDICARE

## 2024-10-10 VITALS
WEIGHT: 258 LBS | TEMPERATURE: 97.6 F | HEIGHT: 65 IN | OXYGEN SATURATION: 96 % | DIASTOLIC BLOOD PRESSURE: 70 MMHG | HEART RATE: 82 BPM | SYSTOLIC BLOOD PRESSURE: 108 MMHG | BODY MASS INDEX: 42.99 KG/M2

## 2024-10-10 DIAGNOSIS — N32.81 OVERACTIVE BLADDER: ICD-10-CM

## 2024-10-10 DIAGNOSIS — M17.0 PRIMARY OSTEOARTHRITIS OF BOTH KNEES: ICD-10-CM

## 2024-10-10 DIAGNOSIS — Z02.89 ENCOUNTER FOR COMPLETION OF FORM WITH PATIENT: ICD-10-CM

## 2024-10-10 PROCEDURE — 99214 OFFICE O/P EST MOD 30 MIN: CPT | Performed by: STUDENT IN AN ORGANIZED HEALTH CARE EDUCATION/TRAINING PROGRAM

## 2024-10-10 ASSESSMENT — ENCOUNTER SYMPTOMS
BLURRED VISION: 0
FEVER: 0
SENSORY CHANGE: 0
HEARTBURN: 0
DEPRESSION: 0
HEADACHES: 0
ABDOMINAL PAIN: 0
CHILLS: 0
PALPITATIONS: 0
BLOOD IN STOOL: 0
WHEEZING: 0
DIZZINESS: 0
BRUISES/BLEEDS EASILY: 0
COUGH: 0
SHORTNESS OF BREATH: 0
WEAKNESS: 0
BACK PAIN: 1
DOUBLE VISION: 0
NAUSEA: 0

## 2024-10-10 ASSESSMENT — FIBROSIS 4 INDEX: FIB4 SCORE: 0.85

## 2024-10-15 ENCOUNTER — APPOINTMENT (OUTPATIENT)
Dept: BEHAVIORAL HEALTH | Facility: CLINIC | Age: 54
End: 2024-10-15
Payer: MEDICARE

## 2024-10-15 ENCOUNTER — DOCUMENTATION (OUTPATIENT)
Dept: BEHAVIORAL HEALTH | Facility: CLINIC | Age: 54
End: 2024-10-15
Payer: MEDICARE

## 2024-10-15 DIAGNOSIS — F19.11 HISTORY OF DRUG ABUSE (HCC): ICD-10-CM

## 2024-10-15 DIAGNOSIS — F51.01 PRIMARY INSOMNIA: ICD-10-CM

## 2024-10-15 DIAGNOSIS — G47.09 OTHER INSOMNIA: ICD-10-CM

## 2024-10-15 DIAGNOSIS — F33.3 DEPRESSION, MAJOR, RECURRENT, SEVERE WITH PSYCHOSIS (HCC): ICD-10-CM

## 2024-10-15 DIAGNOSIS — F41.1 GAD (GENERALIZED ANXIETY DISORDER): ICD-10-CM

## 2024-10-15 DIAGNOSIS — F20.89 OTHER SCHIZOPHRENIA (HCC): ICD-10-CM

## 2024-10-15 PROCEDURE — 99204 OFFICE O/P NEW MOD 45 MIN: CPT | Performed by: PSYCHIATRY & NEUROLOGY

## 2024-10-15 RX ORDER — SERTRALINE HYDROCHLORIDE 100 MG/1
200 TABLET, FILM COATED ORAL
Qty: 180 TABLET | Refills: 3 | Status: SHIPPED | OUTPATIENT
Start: 2024-10-15

## 2024-10-15 RX ORDER — ZOLPIDEM TARTRATE 10 MG/1
10 TABLET ORAL NIGHTLY PRN
Qty: 30 TABLET | Refills: 5 | Status: SHIPPED | OUTPATIENT
Start: 2024-11-01 | End: 2024-12-01

## 2024-10-16 ENCOUNTER — APPOINTMENT (OUTPATIENT)
Dept: RADIOLOGY | Facility: MEDICAL CENTER | Age: 54
End: 2024-10-16
Attending: EMERGENCY MEDICINE
Payer: OTHER MISCELLANEOUS

## 2024-10-16 ENCOUNTER — HOSPITAL ENCOUNTER (EMERGENCY)
Facility: MEDICAL CENTER | Age: 54
End: 2024-10-16
Attending: EMERGENCY MEDICINE
Payer: OTHER MISCELLANEOUS

## 2024-10-16 VITALS
SYSTOLIC BLOOD PRESSURE: 107 MMHG | DIASTOLIC BLOOD PRESSURE: 58 MMHG | TEMPERATURE: 98 F | WEIGHT: 268.52 LBS | HEART RATE: 81 BPM | BODY MASS INDEX: 44.74 KG/M2 | HEIGHT: 65 IN | RESPIRATION RATE: 16 BRPM | OXYGEN SATURATION: 96 %
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
## 2024-10-16 DIAGNOSIS — R10.9 ABDOMINAL PAIN, UNSPECIFIED ABDOMINAL LOCATION: ICD-10-CM

## 2024-10-16 LAB
ALBUMIN SERPL BCP-MCNC: 3.7 G/DL (ref 3.2–4.9)
ALBUMIN/GLOB SERPL: 1.5 G/DL
ALP SERPL-CCNC: 71 U/L (ref 30–99)
ALT SERPL-CCNC: 18 U/L (ref 2–50)
ANION GAP SERPL CALC-SCNC: 10 MMOL/L (ref 7–16)
AST SERPL-CCNC: 20 U/L (ref 12–45)
BASOPHILS # BLD AUTO: 0.8 % (ref 0–1.8)
BASOPHILS # BLD: 0.07 K/UL (ref 0–0.12)
BILIRUB SERPL-MCNC: 0.2 MG/DL (ref 0.1–1.5)
BUN SERPL-MCNC: 10 MG/DL (ref 8–22)
CALCIUM ALBUM COR SERPL-MCNC: 9.3 MG/DL (ref 8.5–10.5)
CALCIUM SERPL-MCNC: 9.1 MG/DL (ref 8.5–10.5)
CHLORIDE SERPL-SCNC: 103 MMOL/L (ref 96–112)
CO2 SERPL-SCNC: 26 MMOL/L (ref 20–33)
CREAT SERPL-MCNC: 0.55 MG/DL (ref 0.5–1.4)
EOSINOPHIL # BLD AUTO: 0.39 K/UL (ref 0–0.51)
EOSINOPHIL NFR BLD: 4.2 % (ref 0–6.9)
ERYTHROCYTE [DISTWIDTH] IN BLOOD BY AUTOMATED COUNT: 44.6 FL (ref 35.9–50)
GFR SERPLBLD CREATININE-BSD FMLA CKD-EPI: 109 ML/MIN/1.73 M 2
GLOBULIN SER CALC-MCNC: 2.4 G/DL (ref 1.9–3.5)
GLUCOSE SERPL-MCNC: 88 MG/DL (ref 65–99)
HCT VFR BLD AUTO: 40.2 % (ref 37–47)
HGB BLD-MCNC: 12.6 G/DL (ref 12–16)
IMM GRANULOCYTES # BLD AUTO: 0.03 K/UL (ref 0–0.11)
IMM GRANULOCYTES NFR BLD AUTO: 0.3 % (ref 0–0.9)
LIPASE SERPL-CCNC: 20 U/L (ref 11–82)
LYMPHOCYTES # BLD AUTO: 2.97 K/UL (ref 1–4.8)
LYMPHOCYTES NFR BLD: 32.3 % (ref 22–41)
MCH RBC QN AUTO: 26.6 PG (ref 27–33)
MCHC RBC AUTO-ENTMCNC: 31.3 G/DL (ref 32.2–35.5)
MCV RBC AUTO: 85 FL (ref 81.4–97.8)
MONOCYTES # BLD AUTO: 0.89 K/UL (ref 0–0.85)
MONOCYTES NFR BLD AUTO: 9.7 % (ref 0–13.4)
NEUTROPHILS # BLD AUTO: 4.84 K/UL (ref 1.82–7.42)
NEUTROPHILS NFR BLD: 52.7 % (ref 44–72)
NRBC # BLD AUTO: 0 K/UL
NRBC BLD-RTO: 0 /100 WBC (ref 0–0.2)
PLATELET # BLD AUTO: 330 K/UL (ref 164–446)
PMV BLD AUTO: 9.8 FL (ref 9–12.9)
POTASSIUM SERPL-SCNC: 4.2 MMOL/L (ref 3.6–5.5)
PROT SERPL-MCNC: 6.1 G/DL (ref 6–8.2)
RBC # BLD AUTO: 4.73 M/UL (ref 4.2–5.4)
SODIUM SERPL-SCNC: 139 MMOL/L (ref 135–145)
WBC # BLD AUTO: 9.2 K/UL (ref 4.8–10.8)

## 2024-10-16 PROCEDURE — 96375 TX/PRO/DX INJ NEW DRUG ADDON: CPT

## 2024-10-16 PROCEDURE — 36415 COLL VENOUS BLD VENIPUNCTURE: CPT

## 2024-10-16 PROCEDURE — 99284 EMERGENCY DEPT VISIT MOD MDM: CPT

## 2024-10-16 PROCEDURE — 700111 HCHG RX REV CODE 636 W/ 250 OVERRIDE (IP): Mod: JZ | Performed by: EMERGENCY MEDICINE

## 2024-10-16 PROCEDURE — 80053 COMPREHEN METABOLIC PANEL: CPT

## 2024-10-16 PROCEDURE — 83690 ASSAY OF LIPASE: CPT

## 2024-10-16 PROCEDURE — 96374 THER/PROPH/DIAG INJ IV PUSH: CPT

## 2024-10-16 PROCEDURE — 700117 HCHG RX CONTRAST REV CODE 255: Performed by: EMERGENCY MEDICINE

## 2024-10-16 PROCEDURE — 74177 CT ABD & PELVIS W/CONTRAST: CPT

## 2024-10-16 PROCEDURE — 85025 COMPLETE CBC W/AUTO DIFF WBC: CPT

## 2024-10-16 RX ORDER — NAPROXEN 500 MG/1
500 TABLET ORAL 2 TIMES DAILY WITH MEALS
Qty: 15 TABLET | Refills: 0 | Status: SHIPPED | OUTPATIENT
Start: 2024-10-16

## 2024-10-16 RX ORDER — ONDANSETRON 2 MG/ML
4 INJECTION INTRAMUSCULAR; INTRAVENOUS ONCE
Status: COMPLETED | OUTPATIENT
Start: 2024-10-16 | End: 2024-10-16

## 2024-10-16 RX ORDER — ONDANSETRON 4 MG/1
4 TABLET, ORALLY DISINTEGRATING ORAL EVERY 6 HOURS PRN
Qty: 10 TABLET | Refills: 0 | Status: SHIPPED | OUTPATIENT
Start: 2024-10-16

## 2024-10-16 RX ORDER — MORPHINE SULFATE 4 MG/ML
4 INJECTION INTRAVENOUS ONCE
Status: COMPLETED | OUTPATIENT
Start: 2024-10-16 | End: 2024-10-16

## 2024-10-16 RX ADMIN — IOHEXOL 100 ML: 350 INJECTION, SOLUTION INTRAVENOUS at 11:05

## 2024-10-16 RX ADMIN — MORPHINE SULFATE 4 MG: 4 INJECTION, SOLUTION INTRAMUSCULAR; INTRAVENOUS at 12:34

## 2024-10-16 RX ADMIN — ONDANSETRON 4 MG: 2 INJECTION INTRAMUSCULAR; INTRAVENOUS at 12:34

## 2024-10-16 ASSESSMENT — FIBROSIS 4 INDEX: FIB4 SCORE: 0.85

## 2024-10-17 ENCOUNTER — TELEPHONE (OUTPATIENT)
Dept: SURGICAL ONCOLOGY | Facility: MEDICAL CENTER | Age: 54
End: 2024-10-17
Payer: MEDICARE

## 2024-10-17 ENCOUNTER — HOSPITAL ENCOUNTER (OUTPATIENT)
Facility: MEDICAL CENTER | Age: 54
End: 2024-10-17
Attending: ORTHOPAEDIC SURGERY | Admitting: ORTHOPAEDIC SURGERY
Payer: MEDICARE

## 2024-10-18 ENCOUNTER — TELEPHONE (OUTPATIENT)
Dept: UROLOGY | Facility: MEDICAL CENTER | Age: 54
End: 2024-10-18
Payer: MEDICARE

## 2024-10-21 ENCOUNTER — OFFICE VISIT (OUTPATIENT)
Dept: MEDICAL GROUP | Age: 54
End: 2024-10-21
Payer: MEDICARE

## 2024-10-21 VITALS
HEART RATE: 74 BPM | OXYGEN SATURATION: 97 % | BODY MASS INDEX: 44.98 KG/M2 | SYSTOLIC BLOOD PRESSURE: 120 MMHG | DIASTOLIC BLOOD PRESSURE: 70 MMHG | WEIGHT: 270 LBS | HEIGHT: 65 IN | TEMPERATURE: 97.3 F

## 2024-10-21 DIAGNOSIS — M54.50 CHRONIC MIDLINE LOW BACK PAIN WITHOUT SCIATICA: ICD-10-CM
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
## 2024-10-21 DIAGNOSIS — G89.29 CHRONIC MIDLINE LOW BACK PAIN WITHOUT SCIATICA: ICD-10-CM

## 2024-10-21 DIAGNOSIS — K42.0 UMBILICAL HERNIA WITH OBSTRUCTION, WITHOUT GANGRENE: ICD-10-CM

## 2024-10-21 PROCEDURE — 3074F SYST BP LT 130 MM HG: CPT | Performed by: STUDENT IN AN ORGANIZED HEALTH CARE EDUCATION/TRAINING PROGRAM

## 2024-10-21 PROCEDURE — 99214 OFFICE O/P EST MOD 30 MIN: CPT | Performed by: STUDENT IN AN ORGANIZED HEALTH CARE EDUCATION/TRAINING PROGRAM

## 2024-10-21 PROCEDURE — 3078F DIAST BP <80 MM HG: CPT | Performed by: STUDENT IN AN ORGANIZED HEALTH CARE EDUCATION/TRAINING PROGRAM

## 2024-10-21 RX ORDER — OXYCODONE HYDROCHLORIDE 10 MG/1
10 TABLET ORAL EVERY 6 HOURS PRN
Qty: 60 TABLET | Refills: 0 | Status: SHIPPED | OUTPATIENT
Start: 2024-10-21 | End: 2024-10-29

## 2024-10-21 ASSESSMENT — ENCOUNTER SYMPTOMS
DEPRESSION: 0
HEADACHES: 0
BLOOD IN STOOL: 0
BRUISES/BLEEDS EASILY: 0
BACK PAIN: 1
SHORTNESS OF BREATH: 0
DOUBLE VISION: 0
FEVER: 0
WEAKNESS: 0
PALPITATIONS: 0
WHEEZING: 0
COUGH: 0
CHILLS: 0
MYALGIAS: 0
BLURRED VISION: 0
DIZZINESS: 0
ORTHOPNEA: 0
ABDOMINAL PAIN: 0

## 2024-10-21 ASSESSMENT — FIBROSIS 4 INDEX: FIB4 SCORE: 0.76

## 2024-10-22 ENCOUNTER — OFFICE VISIT (OUTPATIENT)
Dept: BEHAVIORAL HEALTH | Facility: CLINIC | Age: 54
End: 2024-10-22
Payer: MEDICARE

## 2024-10-22 DIAGNOSIS — F43.23 ADJUSTMENT DISORDER WITH MIXED ANXIETY AND DEPRESSED MOOD: ICD-10-CM

## 2024-10-22 PROCEDURE — 90837 PSYTX W PT 60 MINUTES: CPT | Performed by: MARRIAGE & FAMILY THERAPIST

## 2024-10-23 ENCOUNTER — OFFICE VISIT (OUTPATIENT)
Dept: UROLOGY | Facility: MEDICAL CENTER | Age: 54
End: 2024-10-23
Payer: MEDICARE

## 2024-10-23 DIAGNOSIS — R10.2 PELVIC PAIN: ICD-10-CM

## 2024-10-23 DIAGNOSIS — N39.46 MIXED STRESS AND URGE URINARY INCONTINENCE: ICD-10-CM

## 2024-10-23 LAB
POC POST-VOID: 232 ML
POC PRE-VOID: NORMAL

## 2024-10-23 PROCEDURE — 99214 OFFICE O/P EST MOD 30 MIN: CPT | Performed by: STUDENT IN AN ORGANIZED HEALTH CARE EDUCATION/TRAINING PROGRAM

## 2024-10-23 PROCEDURE — 51798 US URINE CAPACITY MEASURE: CPT | Performed by: STUDENT IN AN ORGANIZED HEALTH CARE EDUCATION/TRAINING PROGRAM

## 2024-10-29 ENCOUNTER — TELEMEDICINE (OUTPATIENT)
Dept: MEDICAL GROUP | Age: 54
End: 2024-10-29
Payer: MEDICARE

## 2024-10-29 VITALS — BODY MASS INDEX: 44.98 KG/M2 | HEIGHT: 65 IN | WEIGHT: 270 LBS

## 2024-10-29 DIAGNOSIS — G89.4 CHRONIC PAIN DISORDER: ICD-10-CM

## 2024-10-29 DIAGNOSIS — M17.11 PRIMARY OSTEOARTHRITIS OF RIGHT KNEE: ICD-10-CM

## 2024-10-29 DIAGNOSIS — M54.50 CHRONIC MIDLINE LOW BACK PAIN WITHOUT SCIATICA: ICD-10-CM

## 2024-10-29 DIAGNOSIS — G89.29 CHRONIC MIDLINE LOW BACK PAIN WITHOUT SCIATICA: ICD-10-CM

## 2024-10-29 RX ORDER — OXYCODONE HYDROCHLORIDE 10 MG/1
10 TABLET ORAL EVERY 6 HOURS PRN
Qty: 120 TABLET | Refills: 0 | Status: SHIPPED | OUTPATIENT
Start: 2024-10-29 | End: 2024-11-30

## 2024-10-29 ASSESSMENT — ENCOUNTER SYMPTOMS
DIZZINESS: 0
HEADACHES: 0
DOUBLE VISION: 0
BRUISES/BLEEDS EASILY: 0
ORTHOPNEA: 0
COUGH: 0
SHORTNESS OF BREATH: 0
BACK PAIN: 1
BLOOD IN STOOL: 0
FEVER: 0
CHILLS: 0
WEAKNESS: 0
BLURRED VISION: 0
PALPITATIONS: 0
ABDOMINAL PAIN: 0

## 2024-10-29 ASSESSMENT — FIBROSIS 4 INDEX: FIB4 SCORE: 0.76

## 2024-11-04 RX ORDER — LISINOPRIL 10 MG/1
10 TABLET ORAL DAILY
Qty: 30 TABLET | Refills: 3 | Status: SHIPPED | OUTPATIENT
Start: 2024-11-04

## 2024-11-05 ENCOUNTER — OFFICE VISIT (OUTPATIENT)
Dept: BEHAVIORAL HEALTH | Facility: CLINIC | Age: 54
End: 2024-11-05
Payer: MEDICARE

## 2024-11-05 DIAGNOSIS — F43.23 ADJUSTMENT DISORDER WITH MIXED ANXIETY AND DEPRESSED MOOD: ICD-10-CM

## 2024-11-05 PROCEDURE — 90837 PSYTX W PT 60 MINUTES: CPT | Performed by: MARRIAGE & FAMILY THERAPIST
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
## 2024-11-19 ENCOUNTER — OFFICE VISIT (OUTPATIENT)
Dept: BEHAVIORAL HEALTH | Facility: CLINIC | Age: 54
End: 2024-11-19
Payer: MEDICARE

## 2024-11-19 DIAGNOSIS — F43.23 ADJUSTMENT DISORDER WITH MIXED ANXIETY AND DEPRESSED MOOD: ICD-10-CM

## 2024-11-19 PROCEDURE — 90837 PSYTX W PT 60 MINUTES: CPT | Performed by: MARRIAGE & FAMILY THERAPIST
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
## 2024-11-25 ENCOUNTER — OFFICE VISIT (OUTPATIENT)
Dept: MEDICAL GROUP | Age: 54
End: 2024-11-25
Payer: MEDICARE

## 2024-11-25 VITALS
OXYGEN SATURATION: 95 % | TEMPERATURE: 96.9 F | WEIGHT: 263 LBS | DIASTOLIC BLOOD PRESSURE: 64 MMHG | HEART RATE: 85 BPM | BODY MASS INDEX: 43.82 KG/M2 | HEIGHT: 65 IN | SYSTOLIC BLOOD PRESSURE: 112 MMHG

## 2024-11-25 DIAGNOSIS — M54.50 CHRONIC MIDLINE LOW BACK PAIN WITHOUT SCIATICA: ICD-10-CM

## 2024-11-25 DIAGNOSIS — G89.29 CHRONIC MIDLINE LOW BACK PAIN WITHOUT SCIATICA: ICD-10-CM

## 2024-11-25 DIAGNOSIS — M17.11 PRIMARY OSTEOARTHRITIS OF RIGHT KNEE: ICD-10-CM

## 2024-11-25 DIAGNOSIS — E66.813 CLASS 3 SEVERE OBESITY DUE TO EXCESS CALORIES WITHOUT SERIOUS COMORBIDITY WITH BODY MASS INDEX (BMI) OF 45.0 TO 49.9 IN ADULT (HCC): ICD-10-CM

## 2024-11-25 DIAGNOSIS — E66.01 CLASS 3 SEVERE OBESITY DUE TO EXCESS CALORIES WITHOUT SERIOUS COMORBIDITY WITH BODY MASS INDEX (BMI) OF 45.0 TO 49.9 IN ADULT (HCC): ICD-10-CM

## 2024-11-25 DIAGNOSIS — Z23 NEED FOR VACCINATION: ICD-10-CM

## 2024-11-25 DIAGNOSIS — N32.81 OVERACTIVE BLADDER: ICD-10-CM

## 2024-11-25 DIAGNOSIS — G47.09 OTHER INSOMNIA: ICD-10-CM

## 2024-11-25 DIAGNOSIS — Z00.00 ENCOUNTER FOR MEDICARE ANNUAL WELLNESS EXAM: ICD-10-CM

## 2024-11-25 DIAGNOSIS — G89.4 CHRONIC PAIN DISORDER: ICD-10-CM

## 2024-11-25 DIAGNOSIS — F33.3 DEPRESSION, MAJOR, RECURRENT, SEVERE WITH PSYCHOSIS (HCC): ICD-10-CM

## 2024-11-25 RX ORDER — OXYCODONE HYDROCHLORIDE 10 MG/1
10 TABLET ORAL EVERY 6 HOURS PRN
Qty: 120 TABLET | Refills: 0 | Status: SHIPPED | OUTPATIENT
Start: 2024-11-25 | End: 2024-12-27

## 2024-11-25 SDOH — ECONOMIC STABILITY: FOOD INSECURITY: WITHIN THE PAST 12 MONTHS, THE FOOD YOU BOUGHT JUST DIDN'T LAST AND YOU DIDN'T HAVE MONEY TO GET MORE.: PATIENT DECLINED

## 2024-11-25 SDOH — HEALTH STABILITY: PHYSICAL HEALTH: ON AVERAGE, HOW MANY MINUTES DO YOU ENGAGE IN EXERCISE AT THIS LEVEL?: 20 MIN

## 2024-11-25 SDOH — ECONOMIC STABILITY: TRANSPORTATION INSECURITY
IN THE PAST 12 MONTHS, HAS THE LACK OF TRANSPORTATION KEPT YOU FROM MEDICAL APPOINTMENTS OR FROM GETTING MEDICATIONS?: NO

## 2024-11-25 SDOH — HEALTH STABILITY: PHYSICAL HEALTH: ON AVERAGE, HOW MANY DAYS PER WEEK DO YOU ENGAGE IN MODERATE TO STRENUOUS EXERCISE (LIKE A BRISK WALK)?: 1 DAY

## 2024-11-25 SDOH — ECONOMIC STABILITY: FOOD INSECURITY: WITHIN THE PAST 12 MONTHS, YOU WORRIED THAT YOUR FOOD WOULD RUN OUT BEFORE YOU GOT MONEY TO BUY MORE.: PATIENT DECLINED

## 2024-11-25 SDOH — ECONOMIC STABILITY: TRANSPORTATION INSECURITY
IN THE PAST 12 MONTHS, HAS LACK OF TRANSPORTATION KEPT YOU FROM MEETINGS, WORK, OR FROM GETTING THINGS NEEDED FOR DAILY LIVING?: NO

## 2024-11-25 SDOH — ECONOMIC STABILITY: TRANSPORTATION INSECURITY
IN THE PAST 12 MONTHS, HAS LACK OF RELIABLE TRANSPORTATION KEPT YOU FROM MEDICAL APPOINTMENTS, MEETINGS, WORK OR FROM GETTING THINGS NEEDED FOR DAILY LIVING?: NO

## 2024-11-25 SDOH — ECONOMIC STABILITY: INCOME INSECURITY: IN THE LAST 12 MONTHS, WAS THERE A TIME WHEN YOU WERE NOT ABLE TO PAY THE MORTGAGE OR RENT ON TIME?: NO

## 2024-11-25 SDOH — ECONOMIC STABILITY: INCOME INSECURITY: HOW HARD IS IT FOR YOU TO PAY FOR THE VERY BASICS LIKE FOOD, HOUSING, MEDICAL CARE, AND HEATING?: VERY HARD

## 2024-11-25 SDOH — HEALTH STABILITY: MENTAL HEALTH
STRESS IS WHEN SOMEONE FEELS TENSE, NERVOUS, ANXIOUS, OR CAN'T SLEEP AT NIGHT BECAUSE THEIR MIND IS TROUBLED. HOW STRESSED ARE YOU?: RATHER MUCH

## 2024-11-25 SDOH — ECONOMIC STABILITY: HOUSING INSECURITY
IN THE LAST 12 MONTHS, WAS THERE A TIME WHEN YOU DID NOT HAVE A STEADY PLACE TO SLEEP OR SLEPT IN A SHELTER (INCLUDING NOW)?: NO

## 2024-11-25 ASSESSMENT — SOCIAL DETERMINANTS OF HEALTH (SDOH)
WITHIN THE PAST 12 MONTHS, YOU WORRIED THAT YOUR FOOD WOULD RUN OUT BEFORE YOU GOT THE MONEY TO BUY MORE: PATIENT DECLINED
HOW OFTEN DO YOU ATTENT MEETINGS OF THE CLUB OR ORGANIZATION YOU BELONG TO?: 1 TO 4 TIMES PER YEAR
ARE YOU MARRIED, WIDOWED, DIVORCED, SEPARATED, NEVER MARRIED, OR LIVING WITH A PARTNER?: NEVER MARRIED
HOW OFTEN DO YOU GET TOGETHER WITH FRIENDS OR RELATIVES?: THREE TIMES A WEEK
IN THE PAST 12 MONTHS, HAS THE ELECTRIC, GAS, OIL, OR WATER COMPANY THREATENED TO SHUT OFF SERVICE IN YOUR HOME?: NO
HOW OFTEN DO YOU GET TOGETHER WITH FRIENDS OR RELATIVES?: THREE TIMES A WEEK
HOW OFTEN DO YOU ATTENT MEETINGS OF THE CLUB OR ORGANIZATION YOU BELONG TO?: 1 TO 4 TIMES PER YEAR
IN A TYPICAL WEEK, HOW MANY TIMES DO YOU TALK ON THE PHONE WITH FAMILY, FRIENDS, OR NEIGHBORS?: TWICE A WEEK
IN A TYPICAL WEEK, HOW MANY TIMES DO YOU TALK ON THE PHONE WITH FAMILY, FRIENDS, OR NEIGHBORS?: TWICE A WEEK
HOW OFTEN DO YOU ATTEND CHURCH OR RELIGIOUS SERVICES?: 1 TO 4 TIMES PER YEAR
HOW OFTEN DO YOU HAVE SIX OR MORE DRINKS ON ONE OCCASION: NEVER
DO YOU BELONG TO ANY CLUBS OR ORGANIZATIONS SUCH AS CHURCH GROUPS UNIONS, FRATERNAL OR ATHLETIC GROUPS, OR SCHOOL GROUPS?: NO
HOW MANY DRINKS CONTAINING ALCOHOL DO YOU HAVE ON A TYPICAL DAY WHEN YOU ARE DRINKING: PATIENT DOES NOT DRINK
HOW OFTEN DO YOU HAVE A DRINK CONTAINING ALCOHOL: NEVER
HOW OFTEN DO YOU ATTEND CHURCH OR RELIGIOUS SERVICES?: 1 TO 4 TIMES PER YEAR
ARE YOU MARRIED, WIDOWED, DIVORCED, SEPARATED, NEVER MARRIED, OR LIVING WITH A PARTNER?: NEVER MARRIED
HOW HARD IS IT FOR YOU TO PAY FOR THE VERY BASICS LIKE FOOD, HOUSING, MEDICAL CARE, AND HEATING?: VERY HARD
DO YOU BELONG TO ANY CLUBS OR ORGANIZATIONS SUCH AS CHURCH GROUPS UNIONS, FRATERNAL OR ATHLETIC GROUPS, OR SCHOOL GROUPS?: NO

## 2024-11-25 ASSESSMENT — LIFESTYLE VARIABLES
SKIP TO QUESTIONS 9-10: 1
HOW MANY STANDARD DRINKS CONTAINING ALCOHOL DO YOU HAVE ON A TYPICAL DAY: PATIENT DOES NOT DRINK
HOW OFTEN DO YOU HAVE SIX OR MORE DRINKS ON ONE OCCASION: NEVER
HOW OFTEN DO YOU HAVE A DRINK CONTAINING ALCOHOL: NEVER
AUDIT-C TOTAL SCORE: 0

## 2024-11-25 ASSESSMENT — FIBROSIS 4 INDEX: FIB4 SCORE: 0.76

## 2024-11-25 NOTE — PATIENT INSTRUCTIONS
-Continue home meds  -Repeat lipid profile prior next appt  -Follow up with pain management  -Follow up with me for med refills

## 2024-11-25 NOTE — PROGRESS NOTES
Subjective:     CC:   Chief Complaint   Patient presents with    Annual Exam     Pt states feeling lots of pain at the moment        HPI:   Janine Capellan is a 54 y.o. female who presents for annual exam.   History of Present Illness  The patient is a 64-year-old female who presents for an annual exam.    She has decided to postpone her knee surgery due to financial constraints, as she is currently on a fixed income. She has been experiencing increased pain in her back, hips, and knees, which she attributes to either the weather or a recent accident. Her last refill for pain medication was on 11/03/2024. She is under the care of a pain specialist, Dr. Crabtree, who has acknowledged her need for pain medication and referred her back to me for a prescription. She has an upcoming appointment with Dr. Crabtree. She occasionally takes Flexeril as needed and has discontinued the use of trazodone.    She is scheduled for eye surgery on 12/03/2024 to remove a silicone implant from her eye.    She has also undergone bladder surgery, which was successful, but requires repetition due to the body's absorption of Botox. This procedure can now be performed in-office, eliminating the need for hospitalization.    She is also on atorvastatin and was advised to have lab work done a few months after starting the medication to determine if the dosage needs to be increased.         Health Maintenance  Advanced directive: N/A   Osteoporosis Screen/ DEXA: N/A   PT for falls prevention: N/A   Cholesterol Screening: Screened   Diabetes Screening: Screened   Aspirin Use: N/A     Anticipatory Guidance  Diet: Regular diet, no restrictions.    Exercise: Unable to exercise regularly, due to back pain.    Substance Abuse: No   Safe in relationship.   Seat belts, bike helmet, gun safety discussed.  Sun protection used.  Dental Home.    Cancer screening  Colorectal Cancer Screening: Needs to schedule colonoscopy   Lung Cancer Screening: N/A   Cervical Cancer  Screening: Up-to-date   Breast Cancer Screening: Up-to-date     Infectious disease screening/Immunizations  --STI Screening: N/A   --Practices safe sex.  --HIV Screening: Screened   --Hepatitis C Screening: Screened   --Immunizations:    Influenza: Given today    HPV: N/A    Tetanus: Up-to-date    Shingles: n/a    Pneumococcal : N/A       Hepatitis B: Up-to-date  COVID-19: Recommended  Other immunizations: N/A     She  has a past medical history of Arthritis (2024), Asthma, Back pain at L4-L5 level (2024), Breath shortness (2024), Cataract (), GERD (gastroesophageal reflux disease), Gynecological disorder (), Heart burn (), High cholesterol (2024), Hypertension (), Psychiatric disorder (), and Urinary incontinence (2024).  She  has a past surgical history that includes hysterectomy laparoscopy; other orthopedic surgery; vitrectomy posterior (Left, 2024); carpal tunnel release (Right); other (); pr cystourethroscopy (N/A, 2024); and injection, botulinum toxin (N/A, 2024).    History reviewed. No pertinent family history.    Social History     Socioeconomic History    Marital status: Single     Spouse name: Not on file    Number of children: Not on file    Years of education: Not on file    Highest education level: GED or equivalent   Occupational History    Not on file   Tobacco Use    Smoking status: Former     Types: Cigarettes     Start date: 2007     Quit date: 1976     Years since quittin.8    Smokeless tobacco: Never    Tobacco comments:     Startred smoking again , then switched to vaping in    Vaping Use    Vaping status: Former    Start date: 2022    Quit date: 2024    Substances: Nicotine   Substance and Sexual Activity    Alcohol use: Not Currently     Comment: recovering alcoholic for 7 years    Drug use: No     Comment: meth user for 1 year & quit     Sexual activity: Not on file   Other Topics Concern     Not on file   Social History Narrative    Not on file     Social Drivers of Health     Financial Resource Strain: High Risk (11/25/2024)    Overall Financial Resource Strain (CARDIA)     Difficulty of Paying Living Expenses: Very hard   Food Insecurity: Patient Declined (11/25/2024)    Hunger Vital Sign     Worried About Running Out of Food in the Last Year: Patient declined     Ran Out of Food in the Last Year: Patient declined   Transportation Needs: No Transportation Needs (11/25/2024)    PRAPARE - Transportation     Lack of Transportation (Medical): No     Lack of Transportation (Non-Medical): No   Physical Activity: Insufficiently Active (11/25/2024)    Exercise Vital Sign     Days of Exercise per Week: 1 day     Minutes of Exercise per Session: 20 min   Stress: Stress Concern Present (11/25/2024)    [employer] - Occupational Stress Questionnaire     Feeling of Stress : Rather much   Social Connections: Moderately Integrated (11/25/2024)    Social Connection and Isolation Panel [NHANES]     Frequency of Communication with Friends and Family: Twice a week     Frequency of Social Gatherings with Friends and Family: Three times a week     Attends Worship Services: 1 to 4 times per year     Active Member of Clubs or Organizations: No     Attends Club or Organization Meetings: 1 to 4 times per year     Marital Status: Never    Intimate Partner Violence: Not on file   Housing Stability: Low Risk  (11/25/2024)    Housing Stability Vital Sign     Unable to Pay for Housing in the Last Year: No     Number of Times Moved in the Last Year: 0     Homeless in the Last Year: No       Patient Active Problem List    Diagnosis Date Noted    Other insomnia 10/15/2024    Overactive bladder 09/10/2024    Morbid obesity with BMI of 40.0-44.9, adult ([facility]) 06/04/2024    Other schizophrenia ([facility]) 06/04/2024    Gastroesophageal reflux disease without esophagitis 06/04/2024    Class 3 severe  obesity due to excess calories without serious comorbidity with body mass index (BMI) of 45.0 to 49.9 in adult ([facility]) 07/02/2023    Depression, major, recurrent, severe with psychosis ([facility]) 05/04/2015    Tobacco abuse 05/04/2015    Alcohol abuse 05/04/2015    History of drug abuse ([facility]) 05/04/2015         Current Outpatient Medications   Medication Sig Dispense Refill    oxyCODONE immediate release (ROXICODONE) 10 MG immediate release tablet Take 1 Tablet by mouth every 6 hours as needed for Moderate Pain for up to 120 doses. 120 Tablet 0    lisinopril (PRINIVIL) 10 MG Tab Take 1 Tablet by mouth every day. MEDICATION INSTRUCTIONS FOR SURGERY/PROCEDURE SCHEDULED FOR  9/24/24 DO NOT TAKE 24 HOURS PRIOR TO SURGERY 30 Tablet 3    naproxen (NAPROSYN) 500 MG Tab Take 1 Tablet by mouth 2 times a day with meals. 15 Tablet 0    ondansetron (ZOFRAN ODT) 4 MG TABLET DISPERSIBLE Take 1 Tablet by mouth every 6 hours as needed for Nausea/Vomiting. 10 Tablet 0    zolpidem (AMBIEN) 10 MG Tab Take 1 Tablet by mouth at bedtime as needed for Sleep for up to 30 days. 30 Tablet 5    sertraline (ZOLOFT) 100 MG Tab Take 2 Tablets by mouth at bedtime. 180 Tablet 3    meloxicam (MOBIC) 7.5 MG Tab Take 1 Tablet by mouth 2 times a day. 60 Tablet 2    omeprazole (PRILOSEC) 40 MG delayed-release capsule TAKE 1 CAPSULE BY MOUTH EVERY DAY IN THE MORNING 90 Capsule 1    pregabalin (LYRICA) 75 MG Cap Take 1 Capsule by mouth 2 times a day for 90 days. (Patient taking differently: Take 75 mg by mouth 2 times a day.       COORDINATE MEDICATION INSTRUCTIONS FROM PRESCRIBING PHYSICIAN for surgery  9/24/24) 60 Capsule 2    atorvastatin (LIPITOR) 20 MG Tab Take 1 Tablet by mouth every evening. (Patient taking differently: Take 20 mg by mouth every evening.     MEDICATION INSTRUCTIONS FOR SURGERY/PROCEDURE SCHEDULED FOR 9/24/24   CONTINUE TAKING MED PRIOR TO SURGERY) 90 Tablet 2    vibegron (GEMTESA) 75 MG tablet Take 1 Tablet by mouth every day. (Patient  taking differently: Take 75 mg by mouth every day.     MEDICATION INSTRUCTIONS FOR SURGERY/PROCEDURE SCHEDULED FOR  9/24/24   CONTINUE TAKING MED PRIOR TO SURGERY) 30 Tablet 3    estradiol (ESTRACE) 0.1 MG/GM vaginal cream Apply a pea sized amount inside vaginal canal daily (Patient taking differently: Apply a pea sized amount inside vaginal canal daily      COORDINATE MEDICATION INSTRUCTIONS FROM PRESCRIBING PHYSICIAN for surgery  9/24/24) 42.5 g 3    traZODone (DESYREL) 100 MG Tab Take 100-200 mg by mouth at bedtime.      estradiol (ESTRACE) 1 MG Tab Take 1 mg by mouth at bedtime.     COORDINATE MEDICATION INSTRUCTIONS FROM PRESCRIBING PHYSICIAN for surgery  9/24/24      cyclobenzaprine (FLEXERIL) 10 mg Tab Take 10 mg by mouth 3 times a day as needed.     COORDINATE MEDICATION INSTRUCTIONS FROM PRESCRIBING PHYSICIAN for surgery 9/24/24  Indications: Muscle Spasm      diclofenac sodium (VOLTAREN) 1 % Gel Apply  topically 4 times a day as needed (for pain).     MEDICATION INSTRUCTIONS FOR SURGERY/PROCEDURE SCHEDULED FOR  9/24/24   DO NOT TAKE 5 DAYS PRIOR TO SURGERY       No current facility-administered medications for this visit.     No Known Allergies    Review of Systems   Constitutional: Negative for fever, chills and malaise/fatigue.   HENT: Negative for congestion.    Eyes: Negative for pain.    Respiratory: Negative for cough and shortness of breath.  Cardiovascular: Negative for leg swelling.   Gastrointestinal: Negative for nausea, vomiting, abdominal pain and diarrhea.   Genitourinary: Negative for dysuria and hematuria.   Skin: Negative for rash.   Neurological: Negative for dizziness, focal weakness and headaches.   Endo/Heme/Allergies: Does not bleed easily.   Psychiatric/Behavioral: Negative for depression.  The patient is not nervous/anxious.      Objective:     /64 (BP Location: Right arm, Patient Position: Sitting, BP Cuff Size: Large adult)   Pulse 85   Temp 36.1 °C (96.9 °F) (Temporal)    "Ht 1.651 m (5' 5\")   Wt 119 kg (263 lb)   LMP 10/24/2009   SpO2 95%   BMI 43.77 kg/m²   Body mass index is 43.77 kg/m².  Wt Readings from Last 4 Encounters:   11/25/24 119 kg (263 lb)   10/29/24 122 kg (270 lb)   10/21/24 122 kg (270 lb)   10/16/24 122 kg (268 lb 8.3 oz)       Physical Exam:  Constitutional: Well-developed and well-nourished. Not diaphoretic. No distress.   Skin: Skin is warm and dry. No rash noted.  Head: Atraumatic without lesions.  Eyes: Conjunctivae and extraocular motions are normal. Pupils are equal, round, and reactive to light. No scleral icterus.   Ears:  External ears unremarkable. Tympanic membranes clear and intact.  Nose: Nares patent. Septum midline. Turbinates without erythema nor edema. No discharge.   Mouth/Throat: Dentition is good. Tongue normal. Oropharynx is clear and moist. Posterior pharynx without erythema or exudates.  Neck: Supple, trachea midline. Normal range of motion. No thyromegaly present. No lymphadenopathy--cervical or supraclavicular.  Cardiovascular: Regular rate and rhythm, S1 and S2 without murmur, rubs, or gallops.  Lungs: Normal inspiratory effort, CTA bilaterally, no wheezes/rhonchi/rales  Breast: Deferred.  Abdomen: Soft, non tender, and without distention. Active bowel sounds in all four quadrants. No rebound, guarding, masses or HSM.  : Deferred.  Extremities: No cyanosis, clubbing, erythema, nor edema. Distal pulses intact and symmetric.   Musculoskeletal: All major joints AROM full in all directions without pain.  Neurological: Alert and oriented x 3. DTRs 2+/3 and symmetric. No cranial nerve deficit. 5/5 myotomes. Sensation intact.   Psychiatric:  Behavior, mood, and affect are appropriate.    A chaperone was offered to the patient during today's exam.: Patient declined.    Assessment and Plan:     1. Encounter for Medicare annual wellness exam  -Patient presented today for scheduled annual wellness visit  -Chronic conditions are listed " below    2. Chronic midline low back pain without sciatica  3. Chronic pain disorder  She reported increased pain in her back, hips, and knees, potentially exacerbated by the weather or a recent accident. She was advised to consult with her pain management specialist, Dr. Crabtree, regarding her current pain levels and potential treatment options. She will discuss with Dr. Crabtree about the possibility of increasing her pain medication dosage.  - oxyCODONE immediate release (ROXICODONE) 10 MG immediate release tablet; Take 1 Tablet by mouth every 6 hours as needed for Moderate Pain for up to 120 doses.  Dispense: 120 Tablet; Refill: 0      4. Depression, major, recurrent, severe with psychosis ([facility])  -Chronic, stable  -Well-managed with sertraline 200 mg daily  -Denies SI  -Continue same therapy    5. Class 3 severe obesity due to excess calories without serious comorbidity with body mass index (BMI) of 45.0 to 49.9 in adult ([facility])  -Chronic, stable  -No significant weight changes over the past 6 months  -Current BMI 43%  -Encouraged to continue weight loss, exercise regularly if possible, adhere strictly with DASH diet or plant-based diet    6. Overactive bladder  She mentioned that her previous bladder surgery was successful but will need to be repeated as the body absorbs the Botox. The procedure can now be done in the office rather than in the hospital.  Following with Dr. Chow.    7. Other insomnia  -Chronic, stable  -Currently taking trazodone at night  -Continue same therapy    8. Primary osteoarthritis of right knee  -Chronic, worsening  -Patient is scheduled for right knee arthroplasty in December, however patient might have to postpone surgery for next summer given financial constraints    9. Need for vaccination  -Flu vaccine was given today  - INFLUENZA VACCINE TRI INJ (PF)     HCM: N/A  Labs per orders  Immunizations per orders  Patient counseled about skin care, diet, supplements, prenatal vitamins, safe sex and  exercise.    Follow-up: Return in about 2 months (around 1/25/2025) for Meds.

## 2024-11-26 ENCOUNTER — APPOINTMENT (OUTPATIENT)
Dept: ADMISSIONS | Facility: MEDICAL CENTER | Age: 54
End: 2024-11-26
Attending: ORTHOPAEDIC SURGERY
Payer: MEDICARE

## 2024-12-05 ENCOUNTER — PATIENT MESSAGE (OUTPATIENT)
Dept: BEHAVIORAL HEALTH | Facility: CLINIC | Age: 54
End: 2024-12-05
Payer: MEDICARE

## 2024-12-05 ENCOUNTER — APPOINTMENT (OUTPATIENT)
Dept: BEHAVIORAL HEALTH | Facility: CLINIC | Age: 54
End: 2024-12-05
Payer: MEDICARE

## 2024-12-17 ENCOUNTER — OFFICE VISIT (OUTPATIENT)
Dept: BEHAVIORAL HEALTH | Facility: CLINIC | Age: 54
End: 2024-12-17
Payer: MEDICARE

## 2024-12-17 DIAGNOSIS — G89.4 CHRONIC PAIN DISORDER: ICD-10-CM

## 2024-12-17 DIAGNOSIS — M54.50 CHRONIC MIDLINE LOW BACK PAIN WITHOUT SCIATICA: ICD-10-CM

## 2024-12-17 DIAGNOSIS — F43.23 ADJUSTMENT DISORDER WITH MIXED ANXIETY AND DEPRESSED MOOD: ICD-10-CM

## 2024-12-17 DIAGNOSIS — G89.29 CHRONIC MIDLINE LOW BACK PAIN WITHOUT SCIATICA: ICD-10-CM

## 2024-12-17 PROCEDURE — 90834 PSYTX W PT 45 MINUTES: CPT | Performed by: MARRIAGE & FAMILY THERAPIST

## 2024-12-17 RX ORDER — MELOXICAM 7.5 MG/1
7.5 TABLET ORAL 2 TIMES DAILY
Qty: 60 TABLET | Refills: 0 | Status: SHIPPED | OUTPATIENT
Start: 2024-12-17 | End: 2024-12-20 | Stop reason: SDUPTHER

## 2024-12-17 NOTE — TELEPHONE ENCOUNTER
The rx refill request was routed to her PCP. Would you like for me to ask if she wants a referral to outside pain management? Please advise.

## 2024-12-17 NOTE — PROGRESS NOTES
[facility]   Therapy Progress Note    Name: Janine Capellan  MRN: [id number]  : 1970  Age: 54 y.o.  Date of assessment: 2024  PCP: Damian Huff M.D.  Persons in attendance: Patient  Total session time: 35 min    Pt reports: I've been feeling really depressed, sleeping and crying a lot. I'm not bonding with my grandkids. Further complicating the picture, Pt doesn't have her own daughter's backing, support in Pt's trying to 'parent' her grandkids/her daughter's kids. Pt feels she is frequently disrespected by her grandkids, she's challenged by them, they don't follow her rules/requests, my daughter and I parent differently [at times]. It's damaging my relationships.'    Pt states: 'I don't know how to do it [parenting] right. Pt recognizes that 'right parenting' has to be articulated by Pt's daughter.'    Pt wants to renegotiate, redefine, her roles: individual, mother, grandmother, sister, mother-in-law. I suggested Pt invite her daughter in to join in a session.    Objective Observations:              Participation:Active verbal participation              Grooming:Casual              Cognition:Alert              Eye Contact:Limited              Mood:Depressed, tearful              Affect:Flat              Thought Process:Logical              Speech:Rate within normal limits     Current Risk:              Suicide: low              Homicide: low              Self-Harm: low             Care Plan Updated: No, but Pt has to work out insurance concerns; she's not sure if her insurance will cover [facility] OP services after first of the years. Pt to consider inviting her daughter to join in session.     Diagnosis: F43.23 Adjustment Disorder with Mixed Anxiety and Depressed Mood.     ANTONIO Moreno

## 2024-12-17 NOTE — TELEPHONE ENCOUNTER
The dose should be 7.5mg for her meloxicam prescription. In general the maximum daily dose of meloxicam is 15mg. In our clinic we do not take over oxycodone prescriptions but I could refer her to an outside pain management clinic if she would like that may be more likely to take over her oxycodone prescription.

## 2024-12-17 NOTE — TELEPHONE ENCOUNTER
It sounds like her message to him was intended for me.  Yes, please ask if she wants a referral to outside pain management

## 2024-12-17 NOTE — TELEPHONE ENCOUNTER
meloxicam (MOBIC) 7.5 MG Tab    Received request via: Patient    Was the patient seen in the last year in this department? Yes    Does the patient have an active prescription (recently filled or refills available) for medication(s) requested?  yes    Pharmacy Name: [id number]     Does the patient have alf Plus and need 100-day supply? (This applies to ALL medications) Patient does not have SCP    Patient comment: On the I was wondering if it was meant to be 75mg rather than 7.5? I've taken these before and they did help but that script was for 100mg. Just wanted to double check if there may have been an error in the written dosage?

## 2024-12-18 NOTE — TELEPHONE ENCOUNTER
Please thank her for the clarification.  Okay to add on for a video visit this week, preferably this morning, if she would like

## 2024-12-18 NOTE — TELEPHONE ENCOUNTER
Caller Name: Janine Hutchins Round    Call Back Number: [phone]      Spoke patient and she was scheduled for the soonest available appt 12/20. We were unable to schedule her this morning. She will be out of her medication after today. She will reach out to her PCP to see if they are able to help with current medication dose until she sees Dr Crabtree and talks about medication dosage increase.

## 2024-12-19 RX ORDER — OXYCODONE HYDROCHLORIDE 10 MG/1
10 TABLET ORAL EVERY 6 HOURS PRN
Qty: 120 TABLET | Refills: 0 | Status: SHIPPED | OUTPATIENT
Start: 2024-12-19 | End: 2024-12-24 | Stop reason: SDUPTHER

## 2024-12-20 ENCOUNTER — OFFICE VISIT (OUTPATIENT)
Dept: URGENT CARE | Facility: PHYSICIAN GROUP | Age: 54
End: 2024-12-20
Payer: MEDICARE

## 2024-12-20 ENCOUNTER — TELEMEDICINE (OUTPATIENT)
Dept: PHYSICAL MEDICINE AND REHAB | Facility: MEDICAL CENTER | Age: 54
End: 2024-12-20
Payer: MEDICARE

## 2024-12-20 VITALS
HEIGHT: 65 IN | RESPIRATION RATE: 24 BRPM | BODY MASS INDEX: 44.07 KG/M2 | SYSTOLIC BLOOD PRESSURE: 158 MMHG | OXYGEN SATURATION: 96 % | TEMPERATURE: 97.8 F | DIASTOLIC BLOOD PRESSURE: 84 MMHG | WEIGHT: 264.5 LBS | HEART RATE: 87 BPM

## 2024-12-20 VITALS
BODY MASS INDEX: 43.99 KG/M2 | WEIGHT: 264 LBS | SYSTOLIC BLOOD PRESSURE: 132 MMHG | DIASTOLIC BLOOD PRESSURE: 87 MMHG | HEART RATE: 80 BPM | OXYGEN SATURATION: 94 % | HEIGHT: 65 IN

## 2024-12-20 DIAGNOSIS — J22 LOWER RESPIRATORY TRACT INFECTION: ICD-10-CM

## 2024-12-20 DIAGNOSIS — M54.9 DORSALGIA: ICD-10-CM

## 2024-12-20 DIAGNOSIS — G89.29 CHRONIC PAIN OF RIGHT KNEE: ICD-10-CM

## 2024-12-20 DIAGNOSIS — R05.1 ACUTE COUGH: ICD-10-CM

## 2024-12-20 DIAGNOSIS — M25.561 CHRONIC PAIN OF RIGHT KNEE: ICD-10-CM

## 2024-12-20 DIAGNOSIS — G89.29 OTHER CHRONIC PAIN: ICD-10-CM

## 2024-12-20 DIAGNOSIS — J98.01 BRONCHOSPASM: ICD-10-CM

## 2024-12-20 DIAGNOSIS — M70.62 GREATER TROCHANTERIC BURSITIS OF LEFT HIP: ICD-10-CM

## 2024-12-20 DIAGNOSIS — M25.562 CHRONIC PAIN OF BOTH KNEES: ICD-10-CM

## 2024-12-20 DIAGNOSIS — M25.561 CHRONIC PAIN OF BOTH KNEES: ICD-10-CM

## 2024-12-20 DIAGNOSIS — G89.29 CHRONIC PAIN OF BOTH KNEES: ICD-10-CM

## 2024-12-20 DIAGNOSIS — M79.10 MYALGIA: ICD-10-CM

## 2024-12-20 DIAGNOSIS — M17.2 POST-TRAUMATIC OSTEOARTHRITIS OF BOTH KNEES: ICD-10-CM

## 2024-12-20 DIAGNOSIS — M72.2 PLANTAR FASCIITIS OF RIGHT FOOT: ICD-10-CM

## 2024-12-20 DIAGNOSIS — M17.11 OSTEOARTHRITIS OF RIGHT KNEE, UNSPECIFIED OSTEOARTHRITIS TYPE: ICD-10-CM

## 2024-12-20 PROCEDURE — 3077F SYST BP >= 140 MM HG: CPT | Performed by: PHYSICIAN ASSISTANT

## 2024-12-20 PROCEDURE — G2211 COMPLEX E/M VISIT ADD ON: HCPCS | Mod: 95 | Performed by: STUDENT IN AN ORGANIZED HEALTH CARE EDUCATION/TRAINING PROGRAM

## 2024-12-20 PROCEDURE — 3079F DIAST BP 80-89 MM HG: CPT | Performed by: PHYSICIAN ASSISTANT

## 2024-12-20 PROCEDURE — 99213 OFFICE O/P EST LOW 20 MIN: CPT | Performed by: PHYSICIAN ASSISTANT

## 2024-12-20 PROCEDURE — 99214 OFFICE O/P EST MOD 30 MIN: CPT | Mod: 95 | Performed by: STUDENT IN AN ORGANIZED HEALTH CARE EDUCATION/TRAINING PROGRAM

## 2024-12-20 RX ORDER — BENZONATATE 100 MG/1
100 CAPSULE ORAL 3 TIMES DAILY PRN
Qty: 21 CAPSULE | Refills: 0 | Status: SHIPPED | OUTPATIENT
Start: 2024-12-20

## 2024-12-20 RX ORDER — ALBUTEROL SULFATE 90 UG/1
2 INHALANT RESPIRATORY (INHALATION) EVERY 6 HOURS PRN
Qty: 8.5 G | Refills: 0 | Status: SHIPPED | OUTPATIENT
Start: 2024-12-20

## 2024-12-20 RX ORDER — DOXYCYCLINE HYCLATE 100 MG
100 TABLET ORAL 2 TIMES DAILY
Qty: 14 TABLET | Refills: 0 | Status: SHIPPED | OUTPATIENT
Start: 2024-12-20 | End: 2024-12-27

## 2024-12-20 RX ORDER — MELOXICAM 7.5 MG/1
7.5 TABLET ORAL 2 TIMES DAILY
Qty: 60 TABLET | Refills: 3 | Status: SHIPPED | OUTPATIENT
Start: 2024-12-20

## 2024-12-20 ASSESSMENT — FIBROSIS 4 INDEX
FIB4 SCORE: 0.76
FIB4 SCORE: 0.76

## 2024-12-20 ASSESSMENT — PATIENT HEALTH QUESTIONNAIRE - PHQ9
5. POOR APPETITE OR OVEREATING: 0 - NOT AT ALL
CLINICAL INTERPRETATION OF PHQ2 SCORE: 1
SUM OF ALL RESPONSES TO PHQ QUESTIONS 1-9: 3

## 2024-12-20 ASSESSMENT — PAIN SCALES - GENERAL: PAINLEVEL_OUTOF10: 6=MODERATE PAIN
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
## 2024-12-21 NOTE — PROGRESS NOTES
"Subjective:   Janine Capellan is a 54 y.o. female who presents for Cough (X4 days, Pt concern about her oxygen, pt catching her breath.)      HPI  The patient presents to the Urgent Care with complaints of a cough onset 4 days ago.  Cough is intermittently productive.  Associated nasal congestion and ear fullness.  Some chills.  She has been monitoring her oxygen level at home and recorded pulse ox above 94 at home.  She can hear and feel rattling on the left side of her lungs. Denies any fever, chest pain. History of pneumonia.  No history of asthma.    Past Medical History:   Diagnosis Date    Arthritis 09/23/2024    total body    Asthma     Back pain at L4-L5 level 09/23/2024    blaire knees, lower back, & hip    Breath shortness 09/23/2024    Cataract 2022    blaire IOL, detatched retina procedure    GERD (gastroesophageal reflux disease)     Gynecological disorder 1996    hysterectomy for endometriosis    Heart burn 1998    GERD    High cholesterol 09/23/2024    on meds    Hypertension 2022    Psychiatric disorder 2017    bipolar    Urinary incontinence 09/23/2024     No Known Allergies     Objective:     BP (!) 158/84 (BP Location: Right arm, Patient Position: Sitting)   Pulse 87   Temp 36.6 °C (97.8 °F)   Resp (!) 24   Ht 1.651 m (5' 5\")   Wt 120 kg (264 lb 8 oz)   LMP 10/24/2009   SpO2 96%   BMI 44.02 kg/m²     Physical Exam  Vitals reviewed.   Constitutional:       General: She is not in acute distress.     Appearance: Normal appearance. She is not ill-appearing or toxic-appearing.   HENT:      Mouth/Throat:      Mouth: Mucous membranes are moist.      Pharynx: Oropharynx is clear. Uvula midline. Posterior oropharyngeal erythema present. No pharyngeal swelling, oropharyngeal exudate or uvula swelling.      Tonsils: No tonsillar exudate or tonsillar abscesses.   Eyes:      Conjunctiva/sclera: Conjunctivae normal.   Cardiovascular:      Rate and Rhythm: Normal rate and regular rhythm.      Heart sounds: " Normal heart sounds.   Pulmonary:      Effort: Pulmonary effort is normal. No respiratory distress.      Breath sounds: No stridor. Wheezing and rhonchi present. No rales.   Musculoskeletal:      Cervical back: Neck supple. No rigidity.   Skin:     General: Skin is warm and dry.   Neurological:      General: No focal deficit present.      Mental Status: She is alert and oriented to person, place, and time.   Psychiatric:         Mood and Affect: Mood normal.         Behavior: Behavior normal.           Diagnosis and associated orders:     1. Bronchospasm  - albuterol 108 (90 Base) MCG/ACT Aero Soln inhalation aerosol; Inhale 2 Puffs every 6 hours as needed for Shortness of Breath.  Dispense: 8.5 g; Refill: 0    2. Lower respiratory tract infection  - amoxicillin-clavulanate (AUGMENTIN) 875-125 MG Tab; Take 1 Tablet by mouth 2 times a day for 7 days.  Dispense: 14 Tablet; Refill: 0  - doxycycline (VIBRAMYCIN) 100 MG Tab; Take 1 Tablet by mouth 2 times a day for 7 days.  Dispense: 14 Tablet; Refill: 0    3. Acute cough  - benzonatate (TESSALON) 100 MG Cap; Take 1 Capsule by mouth 3 times a day as needed for Cough.  Dispense: 21 Capsule; Refill: 0       Comments/MDM:     Tx as above.   Recommended plenty of fluids, rest, Tylenol/Ibuprofen for pain/fever, warm salt water gargles for sore throat, OTC cough and decongestant medication, Flonase, nasal saline washes. If no improvement in 5-7 days or any worsening symptoms, recommend returning to the urgent care for re-evaluation.      I personally reviewed prior external notes and test results pertinent to today's visit. Pathogenesis of diagnosis discussed including typical length and natural progression. Supportive care, natural history, differential diagnoses, and indications for immediate follow-up discussed. Patient expresses understanding and agrees to plan. Patient denies any other questions or concerns.     Follow-up with the primary care physician for recheck,  reevaluation, and consideration of further management.    Please note that this dictation was created using voice recognition software. I have made a reasonable attempt to correct obvious errors, but I expect that there are errors of grammar and possibly content that I did not discover before finalizing the note.    This note was electronically signed by Mack Griffin PA-C

## 2024-12-24 ENCOUNTER — TELEPHONE (OUTPATIENT)
Dept: MEDICAL GROUP | Age: 54
End: 2024-12-24

## 2024-12-24 ENCOUNTER — TELEMEDICINE (OUTPATIENT)
Dept: MEDICAL GROUP | Age: 54
End: 2024-12-24
Payer: MEDICARE

## 2024-12-24 VITALS — HEART RATE: 80 BPM | BODY MASS INDEX: 43.32 KG/M2 | WEIGHT: 260 LBS | HEIGHT: 65 IN | OXYGEN SATURATION: 96 %

## 2024-12-24 DIAGNOSIS — M17.11 PRIMARY OSTEOARTHRITIS OF RIGHT KNEE: ICD-10-CM

## 2024-12-24 DIAGNOSIS — G89.29 CHRONIC MIDLINE LOW BACK PAIN WITHOUT SCIATICA: ICD-10-CM

## 2024-12-24 DIAGNOSIS — G89.4 CHRONIC PAIN DISORDER: ICD-10-CM

## 2024-12-24 DIAGNOSIS — M54.50 CHRONIC MIDLINE LOW BACK PAIN WITHOUT SCIATICA: ICD-10-CM

## 2024-12-24 PROCEDURE — 99214 OFFICE O/P EST MOD 30 MIN: CPT | Mod: 95 | Performed by: STUDENT IN AN ORGANIZED HEALTH CARE EDUCATION/TRAINING PROGRAM

## 2024-12-24 RX ORDER — OXYCODONE HYDROCHLORIDE 10 MG/1
10 TABLET ORAL EVERY 6 HOURS PRN
Qty: 120 TABLET | Refills: 0 | Status: SHIPPED | OUTPATIENT
Start: 2024-12-24 | End: 2025-01-25

## 2024-12-24 RX ORDER — PREGABALIN 75 MG/1
75 CAPSULE ORAL
COMMUNITY
Start: 2024-12-20 | End: 2025-03-20

## 2024-12-24 RX ORDER — ZOLPIDEM TARTRATE 10 MG/1
10 TABLET ORAL
COMMUNITY
Start: 2024-11-30

## 2024-12-24 ASSESSMENT — ENCOUNTER SYMPTOMS
BACK PAIN: 1
COUGH: 0
DIZZINESS: 0
WEAKNESS: 0
SHORTNESS OF BREATH: 0
BLOOD IN STOOL: 0
FEVER: 0
HEADACHES: 0
PALPITATIONS: 0
BRUISES/BLEEDS EASILY: 0
ABDOMINAL PAIN: 0
CHILLS: 0
DEPRESSION: 0
WHEEZING: 0
ORTHOPNEA: 0

## 2024-12-24 ASSESSMENT — FIBROSIS 4 INDEX: FIB4 SCORE: 0.76

## 2024-12-24 NOTE — PROGRESS NOTES
Virtual Visit: Established Patient   This visit was conducted via Teams using secure and encrypted videoconferencing technology.   The patient was in their home in the [facility].    The patient's identity was confirmed and verbal consent was obtained for this virtual visit.    Subjective:   CC:   Chief Complaint   Patient presents with    Medication Refill     oxyCODONE immediate release (ROXICODONE) 10 MG immediate release tablet REFILL  Seen pain management - agreed to up oxy to another pill a day      Janine Capellan is a 54 y.o. female presenting for evaluation and management of:  History of Present Illness  The patient is a 54-year-old female who presents via virtual visit for pain management.    She reports persistent shoulder pain. She sought medical attention at an urgent care facility on either Friday or Saturday night, where she was diagnosed with bronchitis and fluid accumulation in her ears. She was prescribed doxycycline and Augmentin as part of her treatment regimen. She has been experiencing ongoing pain, necessitating a refill of her pain medication. A referral was initiated on the 19th, but it did not materialize. She had a consultation with Dr. Caballero, a pain management specialist, who advised that any increase in medication dosage should be approved through pain management. Consequently, she had an appointment with Dr. Osuna, who agreed to increase her medication by one pill per day for a duration of two months, after which the dosage would revert to four pills daily until the completion of her procedures. However, Dr. Osuna informed her that their office could not prescribe the medication, and she would need to consult with an out-of-network pain management doctor, which would incur out-of-pocket expenses. She has been scheduled for a knee block procedure on 01/08/2025 and a hip procedure on 01/25/2025. She is unable to afford knee surgery at present, so these procedures are being  considered as alternatives. She did not receive the refill prescribed on the 19th as it was too early for a refill. She has enrolled in Medicare Advantage, which will become effective on 01/01/2025, and she is uncertain about the coverage provided by this new plan.    MEDICATIONS  Current: Doxycycline, Augmentin, oxycodone         ROS   Review of Systems   Constitutional:  Negative for chills, fever and malaise/fatigue.   HENT:  Negative for nosebleeds and tinnitus.    Respiratory:  Negative for cough, shortness of breath and wheezing.    Cardiovascular:  Negative for chest pain, palpitations, orthopnea and leg swelling.   Gastrointestinal:  Negative for abdominal pain, blood in stool and melena.   Genitourinary:  Negative for dysuria and urgency.   Musculoskeletal:  Positive for back pain and joint pain.   Skin:  Negative for rash.   Neurological:  Negative for dizziness, weakness and headaches.   Endo/Heme/Allergies:  Does not bruise/bleed easily.   Psychiatric/Behavioral:  Negative for depression and suicidal ideas.         Current medicines (including changes today)  Current Outpatient Medications   Medication Sig Dispense Refill    zolpidem (AMBIEN) 10 MG Tab Take 10 mg by mouth at bedtime as needed for Sleep. For up to 30 days      pregabalin (LYRICA) 75 MG Cap Take 75 mg by mouth.      oxyCODONE immediate release (ROXICODONE) 10 MG immediate release tablet Take 1 Tablet by mouth every 6 hours as needed for Moderate Pain for up to 120 doses. 120 Tablet 0    meloxicam (MOBIC) 7.5 MG Tab Take 1 Tablet by mouth 2 times a day. 60 Tablet 3    amoxicillin-clavulanate (AUGMENTIN) 875-125 MG Tab Take 1 Tablet by mouth 2 times a day for 7 days. 14 Tablet 0    albuterol 108 (90 Base) MCG/ACT Aero Soln inhalation aerosol Inhale 2 Puffs every 6 hours as needed for Shortness of Breath. 8.5 g 0    doxycycline (VIBRAMYCIN) 100 MG Tab Take 1 Tablet by mouth 2 times a day for 7 days. 14 Tablet 0    lisinopril (PRINIVIL) 10 MG  Tab Take 1 Tablet by mouth every day. MEDICATION INSTRUCTIONS FOR SURGERY/PROCEDURE SCHEDULED FOR  9/24/24 DO NOT TAKE 24 HOURS PRIOR TO SURGERY 30 Tablet 3    ondansetron (ZOFRAN ODT) 4 MG TABLET DISPERSIBLE Take 1 Tablet by mouth every 6 hours as needed for Nausea/Vomiting. 10 Tablet 0    sertraline (ZOLOFT) 100 MG Tab Take 2 Tablets by mouth at bedtime. 180 Tablet 3    omeprazole (PRILOSEC) 40 MG delayed-release capsule TAKE 1 CAPSULE BY MOUTH EVERY DAY IN THE MORNING 90 Capsule 1    atorvastatin (LIPITOR) 20 MG Tab Take 1 Tablet by mouth every evening. (Patient taking differently: Take 20 mg by mouth every evening.     MEDICATION INSTRUCTIONS FOR SURGERY/PROCEDURE SCHEDULED FOR 9/24/24   CONTINUE TAKING MED PRIOR TO SURGERY) 90 Tablet 2    vibegron (GEMTESA) 75 MG tablet Take 1 Tablet by mouth every day. (Patient taking differently: Take 75 mg by mouth every day.     MEDICATION INSTRUCTIONS FOR SURGERY/PROCEDURE SCHEDULED FOR  9/24/24   CONTINUE TAKING MED PRIOR TO SURGERY) 30 Tablet 3    estradiol (ESTRACE) 1 MG Tab Take 1 mg by mouth at bedtime.     COORDINATE MEDICATION INSTRUCTIONS FROM PRESCRIBING PHYSICIAN for surgery  9/24/24      cyclobenzaprine (FLEXERIL) 10 mg Tab Take 10 mg by mouth 3 times a day as needed.     COORDINATE MEDICATION INSTRUCTIONS FROM PRESCRIBING PHYSICIAN for surgery 9/24/24  Indications: Muscle Spasm      benzonatate (TESSALON) 100 MG Cap Take 1 Capsule by mouth 3 times a day as needed for Cough. (Patient not taking: Reported on 12/24/2024) 21 Capsule 0    estradiol (ESTRACE) 0.1 MG/GM vaginal cream Apply a pea sized amount inside vaginal canal daily (Patient not taking: Reported on 12/24/2024) 42.5 g 3    traZODone (DESYREL) 100 MG Tab Take 100-200 mg by mouth at bedtime. (Patient not taking: Reported on 12/24/2024)      diclofenac sodium (VOLTAREN) 1 % Gel Apply  topically 4 times a day as needed (for pain).     MEDICATION INSTRUCTIONS FOR SURGERY/PROCEDURE SCHEDULED FOR  9/24/24  "  DO NOT TAKE 5 DAYS PRIOR TO SURGERY (Patient not taking: Reported on 12/20/2024)       No current facility-administered medications for this visit.       Patient Active Problem List    Diagnosis Date Noted    Primary osteoarthritis of right knee 11/25/2024    Other insomnia 10/15/2024    Overactive bladder 09/10/2024    Morbid obesity with BMI of 40.0-44.9, adult ([facility]) 06/04/2024    Other schizophrenia ([facility]) 06/04/2024    Gastroesophageal reflux disease without esophagitis 06/04/2024    Class 3 severe obesity due to excess calories without serious comorbidity with body mass index (BMI) of 45.0 to 49.9 in adult ([facility]) 07/02/2023    Depression, major, recurrent, severe with psychosis ([facility]) 05/04/2015    Tobacco abuse 05/04/2015    Alcohol abuse 05/04/2015    History of drug abuse ([facility]) 05/04/2015        Objective:   Pulse 80   Ht 1.651 m (5' 5\")   Wt 118 kg (260 lb)   LMP 10/24/2009   SpO2 96%   BMI 43.27 kg/m²     Physical Exam:  Physical Exam  Vitals reviewed.   Constitutional:       General: She is not in acute distress.  HENT:      Head: Normocephalic and atraumatic.      Mouth/Throat:      Mouth: Mucous membranes are moist.   Eyes:      General: No scleral icterus.     Extraocular Movements: Extraocular movements intact.      Pupils: Pupils are equal, round, and reactive to light.   Cardiovascular:      Rate and Rhythm: Normal rate and regular rhythm.      Pulses: Normal pulses.      Heart sounds: Normal heart sounds. No murmur heard.  Pulmonary:      Effort: Pulmonary effort is normal. No respiratory distress.      Breath sounds: Normal breath sounds. No wheezing.   Abdominal:      General: There is no distension.      Palpations: Abdomen is soft.      Tenderness: There is no abdominal tenderness. There is no guarding or rebound.   Musculoskeletal:      Cervical back: Normal range of motion and neck supple.      Right lower leg: No edema.      Left lower leg: No edema.   Skin:     General: Skin is warm.    "   Capillary Refill: Capillary refill takes less than 2 seconds.      Coloration: Skin is not jaundiced.      Findings: No bruising.   Neurological:      General: No focal deficit present.      Mental Status: She is alert.   Psychiatric:         Mood and Affect: Mood normal.         Behavior: Behavior normal.        Constitutional: Alert, no distress, well-groomed.  Skin: No rashes in visible areas.  Psych: Alert and oriented x3, normal affect and mood.     Assessment and Plan:   The following treatment plan was discussed:     1. Chronic midline low back pain without sciatica  2. Chronic pain disorder  She is currently on a regimen of 120 pills of oxycodone 10 mg, administered every 6 hours. She has an upcoming appointment with Dr. Walsh on 01/28/2025. She was advised to consult with a pain management specialist for further treatment and to communicate clearly with them regarding her condition. A prescription refill for 120 pills of oxycodone 10 mg, to be taken every 6 hours for a duration of 1 month, was provided. This refill is intended to sustain her until her next appointment with Dr. Walsh. A message will be sent to Dr. Walsh regarding her condition.  - oxyCODONE immediate release (ROXICODONE) 10 MG immediate release tablet; Take 1 Tablet by mouth every 6 hours as needed for Moderate Pain for up to 120 doses.  Dispense: 120 Tablet; Refill: 0    3. Primary osteoarthritis of right knee  -Chronic, worsening  -She was scheduled for knee replacement surgery this month however was rescheduled because she cannot afford procedure.  -Patient to follow-up with pain management and orthopedics       Follow-up: Return if symptoms worsen or fail to improve.

## 2024-12-25 NOTE — TELEPHONE ENCOUNTER
Phone Number Called: [phone]    Call outcome: Spoke to patient regarding message below.    Message: Pt had a virtual visit 12/24/24. Pt had a refill for her Oxy sent. Dr. HODGES okayed the refill to be filled today 12/24/24.     Pt sent a [facility] message stating [facility] wouldn't [facility]. [facility] they stated that she picked up a 1 month supply of her oxy rx on 12/01/24 and they will not fill her rx till 12/29/24.     I called the pt back and let the pt know that her oxy rx will not be filled till 12/29/24.

## 2025-01-03 ENCOUNTER — HOSPITAL ENCOUNTER (OUTPATIENT)
Facility: REHABILITATION | Age: 55
End: 2025-01-03
Attending: STUDENT IN AN ORGANIZED HEALTH CARE EDUCATION/TRAINING PROGRAM | Admitting: STUDENT IN AN ORGANIZED HEALTH CARE EDUCATION/TRAINING PROGRAM
Payer: COMMERCIAL

## 2025-01-09 DIAGNOSIS — M25.562 CHRONIC PAIN OF BOTH KNEES: ICD-10-CM

## 2025-01-09 DIAGNOSIS — M70.62 GREATER TROCHANTERIC BURSITIS OF LEFT HIP: ICD-10-CM

## 2025-01-09 DIAGNOSIS — G89.29 CHRONIC PAIN OF BOTH KNEES: ICD-10-CM

## 2025-01-09 DIAGNOSIS — M25.561 CHRONIC PAIN OF RIGHT KNEE: ICD-10-CM

## 2025-01-09 DIAGNOSIS — M79.10 MYALGIA: ICD-10-CM

## 2025-01-09 DIAGNOSIS — M17.11 OSTEOARTHRITIS OF RIGHT KNEE, UNSPECIFIED OSTEOARTHRITIS TYPE: ICD-10-CM

## 2025-01-09 DIAGNOSIS — G89.29 CHRONIC PAIN OF RIGHT KNEE: ICD-10-CM

## 2025-01-09 DIAGNOSIS — M72.2 PLANTAR FASCIITIS OF RIGHT FOOT: ICD-10-CM

## 2025-01-09 DIAGNOSIS — M25.561 CHRONIC PAIN OF BOTH KNEES: ICD-10-CM

## 2025-01-10 ENCOUNTER — TELEPHONE (OUTPATIENT)
Dept: PHYSICAL MEDICINE AND REHAB | Facility: MEDICAL CENTER | Age: 55
End: 2025-01-10
Payer: COMMERCIAL

## 2025-01-11 NOTE — TELEPHONE ENCOUNTER
Caller Name: Janine Hutchins Round   Call Back Number: [phone]     How would the patient prefer to be contacted with a response: Phone call OK to leave a detailed message    Patient called because her insurance changed and [facility] is now out of network. She is asking to see if a referral can be placed to another pain management clinic such as [facility] or [facility]. She is no longer able to go to her PCP and he no longer wanted to manage opioid medication and did not feel comfortable increasing the dosage. She needs to be able to get into a new pain management clinic STAT because she only has 8 days of medication left. Please advise if a referral can be placed.

## 2025-01-13 ENCOUNTER — PATIENT MESSAGE (OUTPATIENT)
Dept: MEDICAL GROUP | Age: 55
End: 2025-01-13
Payer: COMMERCIAL

## 2025-01-13 DIAGNOSIS — N39.41 URGENCY INCONTINENCE: ICD-10-CM

## 2025-01-13 NOTE — PATIENT COMMUNICATION
Phone Number Called: [phone]     Call outcome: Spoke to patient regarding message below.    Message: Let patient know that we do accept [employer] as long as its not through Medicaid. If it is through Medicaid then she would has to establish care outside of [facility]. I let the patient know to double check and make sure it's not under medicaid and if not she can call [phone] update her insurance information and get a list of providers that accept [facility] care.

## 2025-01-14 ENCOUNTER — APPOINTMENT (OUTPATIENT)
Dept: BEHAVIORAL HEALTH | Facility: CLINIC | Age: 55
End: 2025-01-14
Payer: COMMERCIAL

## 2025-01-15 ENCOUNTER — APPOINTMENT (OUTPATIENT)
Dept: BEHAVIORAL HEALTH | Facility: CLINIC | Age: 55
End: 2025-01-15
Payer: COMMERCIAL

## 2025-01-15 DIAGNOSIS — K21.9 GASTROESOPHAGEAL REFLUX DISEASE WITHOUT ESOPHAGITIS: ICD-10-CM

## 2025-01-15 RX ORDER — OMEPRAZOLE 40 MG/1
40 CAPSULE, DELAYED RELEASE ORAL EVERY MORNING
Qty: 90 CAPSULE | Refills: 1 | Status: SHIPPED | OUTPATIENT
Start: 2025-01-15

## 2025-01-16 ENCOUNTER — OFFICE VISIT (OUTPATIENT)
Dept: MEDICAL GROUP | Age: 55
End: 2025-01-16
Payer: COMMERCIAL

## 2025-01-16 VITALS
WEIGHT: 260 LBS | BODY MASS INDEX: 43.32 KG/M2 | DIASTOLIC BLOOD PRESSURE: 80 MMHG | HEART RATE: 80 BPM | SYSTOLIC BLOOD PRESSURE: 134 MMHG | OXYGEN SATURATION: 95 % | TEMPERATURE: 97.5 F | HEIGHT: 65 IN

## 2025-01-16 DIAGNOSIS — E78.2 MIXED DYSLIPIDEMIA: ICD-10-CM

## 2025-01-16 DIAGNOSIS — Z12.11 COLON CANCER SCREENING: ICD-10-CM

## 2025-01-16 DIAGNOSIS — G89.29 CHRONIC MIDLINE LOW BACK PAIN WITHOUT SCIATICA: ICD-10-CM

## 2025-01-16 DIAGNOSIS — G89.4 CHRONIC PAIN DISORDER: ICD-10-CM

## 2025-01-16 DIAGNOSIS — M54.50 CHRONIC MIDLINE LOW BACK PAIN WITHOUT SCIATICA: ICD-10-CM

## 2025-01-16 PROCEDURE — 99214 OFFICE O/P EST MOD 30 MIN: CPT | Performed by: STUDENT IN AN ORGANIZED HEALTH CARE EDUCATION/TRAINING PROGRAM

## 2025-01-16 PROCEDURE — 3075F SYST BP GE 130 - 139MM HG: CPT | Performed by: STUDENT IN AN ORGANIZED HEALTH CARE EDUCATION/TRAINING PROGRAM

## 2025-01-16 PROCEDURE — 3079F DIAST BP 80-89 MM HG: CPT | Performed by: STUDENT IN AN ORGANIZED HEALTH CARE EDUCATION/TRAINING PROGRAM

## 2025-01-16 RX ORDER — OXYCODONE HYDROCHLORIDE 10 MG/1
10 TABLET ORAL EVERY 6 HOURS PRN
Qty: 120 TABLET | Refills: 0 | Status: SHIPPED | OUTPATIENT
Start: 2025-01-16 | End: 2025-02-17

## 2025-01-16 ASSESSMENT — ENCOUNTER SYMPTOMS
COUGH: 0
BRUISES/BLEEDS EASILY: 0
WEAKNESS: 0
ABDOMINAL PAIN: 0
DIZZINESS: 0
SHORTNESS OF BREATH: 0
DEPRESSION: 0
CHILLS: 0
BLOOD IN STOOL: 0
ORTHOPNEA: 0
FEVER: 0
PALPITATIONS: 0
MYALGIAS: 0
DOUBLE VISION: 0
BLURRED VISION: 0
WHEEZING: 0
HEADACHES: 0
SENSORY CHANGE: 0
BACK PAIN: 1

## 2025-01-16 ASSESSMENT — FIBROSIS 4 INDEX: FIB4 SCORE: 0.76

## 2025-01-16 ASSESSMENT — PATIENT HEALTH QUESTIONNAIRE - PHQ9: CLINICAL INTERPRETATION OF PHQ2 SCORE: 0

## 2025-01-16 NOTE — PROGRESS NOTES
Subjective:     CC: Med refills    HPI:   History of Present Illness  The patient is a 54-year-old female presenting for pain medication refills.    She has been referred to [facility] by Dr. Caballero due to a change in her insurance coverage, which no longer includes many of the [place] services. She has an upcoming appointment on 01/20/2025 at 9:30 AM. However, she anticipates that she will not receive a prescription during this initial visit, as per the clinic's policy, and will have to wait until the second visit, which is probably a month after that. She has expressed hope that the new clinic will assume responsibility for her prescriptions. She has exhausted her current supply of medications, with the last dose taken yesterday. She has also consulted with the pain management team regarding previously scheduled procedures by Dr. Caballero, who has agreed to review her records and proceed accordingly. She is aware of the potential for withdrawal symptoms due to her current lack of medication.    She has been on atorvastatin for about 6 months now and is due for lab work to check her cholesterol levels.    Supplemental Information  She has an appointment with urology on 02/19/2025 to address her incontinence issues, which have recurred. She is scheduled for another procedure.    MEDICATIONS  atorvastatin       ROS:  Review of Systems   Constitutional:  Negative for chills, fever and malaise/fatigue.   HENT:  Negative for nosebleeds and tinnitus.    Eyes:  Negative for blurred vision and double vision.   Respiratory:  Negative for cough, shortness of breath and wheezing.    Cardiovascular:  Negative for chest pain, palpitations, orthopnea and leg swelling.   Gastrointestinal:  Negative for abdominal pain, blood in stool and melena.   Genitourinary:  Negative for dysuria and urgency.   Musculoskeletal:  Positive for back pain and joint pain. Negative for myalgias.   Skin:  Negative for rash.   Neurological:   "Negative for dizziness, sensory change, weakness and headaches.   Endo/Heme/Allergies:  Does not bruise/bleed easily.   Psychiatric/Behavioral:  Negative for depression and suicidal ideas.        Objective:     Exam:  /80 (BP Location: Left arm, Patient Position: Sitting, BP Cuff Size: Large adult)   Pulse 80   Temp 36.4 °C (97.5 °F) (Temporal)   Ht 1.651 m (5' 5\")   Wt 118 kg (260 lb)   LMP 10/24/2009   SpO2 95%   BMI 43.27 kg/m²  Body mass index is 43.27 kg/m².    Physical Exam  Vitals reviewed.   Constitutional:       General: She is not in acute distress.  HENT:      Head: Normocephalic and atraumatic.      Mouth/Throat:      Mouth: Mucous membranes are moist.   Eyes:      Extraocular Movements: Extraocular movements intact.      Pupils: Pupils are equal, round, and reactive to light.   Cardiovascular:      Rate and Rhythm: Normal rate and regular rhythm.      Pulses: Normal pulses.      Heart sounds: Normal heart sounds. No murmur heard.  Pulmonary:      Effort: Pulmonary effort is normal. No respiratory distress.      Breath sounds: Normal breath sounds. No wheezing.   Abdominal:      General: There is no distension.      Palpations: Abdomen is soft.      Tenderness: There is no abdominal tenderness. There is no guarding or rebound.   Musculoskeletal:      Cervical back: Normal range of motion and neck supple.      Right lower leg: No edema.      Left lower leg: No edema.   Skin:     General: Skin is warm.      Capillary Refill: Capillary refill takes less than 2 seconds.      Coloration: Skin is not jaundiced.   Neurological:      General: No focal deficit present.      Mental Status: She is alert.      Cranial Nerves: No cranial nerve deficit.   Psychiatric:         Mood and Affect: Mood normal.         Behavior: Behavior normal.       Labs: None    Assessment & Plan:     54 y.o. female with the following -     1. Chronic midline low back pain without sciatica  2. Chronic pain disorder  She is " currently out of her pain medications and has an upcoming appointment with [facility] on 01/20/2025. It was discussed that the new pain management clinic will take over her prescriptions after the initial visit. A prescription for her pain medication has been issued for a duration of one month to bridge the gap until her new clinic can take over.  - oxyCODONE immediate release (ROXICODONE) 10 MG immediate release tablet; Take 1 Tablet by mouth every 6 hours as needed for Moderate Pain for up to 120 doses.  Dispense: 120 Tablet; Refill: 0      3. Mixed dyslipidemia  She has been on atorvastatin 20 mg daily for about 6 months and is due for lab work to check her cholesterol levels. She is advised to complete the pending fasting lab work within the next 3 months before the order expires.  Also encouraged to exercise regularly, DASH diet or plant-based diet.    4. Colon cancer screening  -For screening  - Referral to GI for Colonoscopy     Return in about 6 months (around 7/16/2025) for Meds.    Please note that this dictation was created using voice recognition software. I have made every reasonable attempt to correct obvious errors, but I expect that there are errors of grammar and possibly content that I did not discover before finalizing the note.

## 2025-01-16 NOTE — PATIENT INSTRUCTIONS
-Continue home meds  -Follow up with Pain Management   -Follow up with Urologist  -Follow up with me within 4-6 months

## 2025-01-17 DIAGNOSIS — G89.4 CHRONIC PAIN DISORDER: ICD-10-CM

## 2025-01-20 RX ORDER — PREGABALIN 75 MG/1
75 CAPSULE ORAL 2 TIMES DAILY
Qty: 120 CAPSULE | Refills: 2 | Status: SHIPPED | OUTPATIENT
Start: 2025-01-20 | End: 2025-07-19

## 2025-01-22 ENCOUNTER — TELEPHONE (OUTPATIENT)
Dept: MEDICAL GROUP | Age: 55
End: 2025-01-22
Payer: COMMERCIAL

## 2025-01-22 NOTE — TELEPHONE ENCOUNTER
Phone Number Called: [phone]    Call outcome: Spoke to patient regarding message below.    Message: pat called me 2x and left me 2 messages regarding her prior auth for her oxy, I told her I finished the prior auth on my end and I am just waiting to hear back whether it is approved or denied. I told her I will call her as soon as I know the status.

## 2025-01-28 ENCOUNTER — APPOINTMENT (OUTPATIENT)
Dept: PHYSICAL MEDICINE AND REHAB | Facility: MEDICAL CENTER | Age: 55
End: 2025-01-28
Payer: COMMERCIAL

## 2025-01-29 ENCOUNTER — APPOINTMENT (OUTPATIENT)
Dept: BEHAVIORAL HEALTH | Facility: CLINIC | Age: 55
End: 2025-01-29
Payer: COMMERCIAL

## 2025-02-10 RX ORDER — LISINOPRIL 10 MG/1
10 TABLET ORAL DAILY
Qty: 90 TABLET | Refills: 1 | Status: SHIPPED | OUTPATIENT
Start: 2025-02-10

## 2025-02-12 ENCOUNTER — APPOINTMENT (OUTPATIENT)
Dept: BEHAVIORAL HEALTH | Facility: CLINIC | Age: 55
End: 2025-02-12
Payer: COMMERCIAL

## 2025-03-04 ENCOUNTER — APPOINTMENT (OUTPATIENT)
Dept: LAB | Facility: MEDICAL CENTER | Age: 55
End: 2025-03-04

## 2025-04-17 ENCOUNTER — APPOINTMENT (OUTPATIENT)
Dept: MEDICAL GROUP | Age: 55
End: 2025-04-17
Payer: MEDICARE

## 2025-04-17 DIAGNOSIS — E78.5 DYSLIPIDEMIA: ICD-10-CM

## 2025-04-17 NOTE — TELEPHONE ENCOUNTER
Received request via: Pharmacy    Was the patient seen in the last year in this department? Yes    Does the patient have an active prescription (recently filled or refills available) for medication(s) requested? No    Pharmacy Name: [facility] #[id number] - [place], [place] - [address] AT [address]    Does the patient have [facility] Plus and need 100-day supply? (This applies to ALL medications) Patient does not have SCP   Requested Prescriptions     Pending Prescriptions Disp Refills    atorvastatin (LIPITOR) 20 MG Tab [Pharmacy Med Name: ATORVASTATIN 20 MG TABLET] 90 Tablet 2     Sig: TAKE 1 TABLET BY MOUTH EVERY DAY IN THE EVENING

## 2025-04-18 RX ORDER — ATORVASTATIN CALCIUM 20 MG/1
20 TABLET, FILM COATED ORAL EVERY EVENING
Qty: 90 TABLET | Refills: 2 | Status: SHIPPED | OUTPATIENT
Start: 2025-04-18

## 2025-07-17 DIAGNOSIS — K21.9 GASTROESOPHAGEAL REFLUX DISEASE WITHOUT ESOPHAGITIS: ICD-10-CM

## 2025-07-17 RX ORDER — OMEPRAZOLE 40 MG/1
40 CAPSULE, DELAYED RELEASE ORAL EVERY MORNING
Qty: 90 CAPSULE | Refills: 1 | Status: SHIPPED | OUTPATIENT
Start: 2025-07-17

## 2025-08-01 RX ORDER — LISINOPRIL 10 MG/1
10 TABLET ORAL DAILY
Qty: 90 TABLET | Refills: 1 | Status: SHIPPED | OUTPATIENT
Start: 2025-08-01

## (undated) DEVICE — TUBE CONNECTING SUCTION - CLEAR PLASTIC STERILE 72 IN (50EA/CA)

## (undated) DEVICE — LACTATED RINGERS INJ 1000 ML - (14EA/CA 60CA/PF)

## (undated) DEVICE — PACK CYSTOSCOPY III - (8/CA)

## (undated) DEVICE — BAG URODRAIN WITH TUBING - (20/CA)

## (undated) DEVICE — CANISTER SUCTION RIGID RED 1500CC (40EA/CA)

## (undated) DEVICE — MASK OXYGEN VNYL ADLT MED CONC WITH 7 FOOT TUBING - (50EA/CA)

## (undated) DEVICE — SUTURE GENERAL

## (undated) DEVICE — SENSOR OXIMETER ADULT SPO2 RD SET (20EA/BX)

## (undated) DEVICE — KIT  I.V. START (100EA/CA)

## (undated) DEVICE — WATER IRRIG. STER 3000 ML - (4/CA)

## (undated) DEVICE — PACK CYSTO III (2EA/CA)

## (undated) DEVICE — TUBING CLEARLINK DUO-VENT - C-FLO (48EA/CA)

## (undated) DEVICE — TUBE CONNECT SUCTION CLEAR 120 X 1/4" (50EA/CA)"

## (undated) DEVICE — NEEDLE INJECTION ADJUSTABLE TIP CYSTOSCOPIC INJETAK 70CM (2EA/BX)

## (undated) DEVICE — TOWEL STOP TIMEOUT SAFETY FLAG (40EA/CA)

## (undated) DEVICE — SYRINGE SAFETY 10 ML 18 GA X 1 1/2 BLUNT LL (100/BX 4BX/CA)

## (undated) DEVICE — SET LEADWIRE 5 LEAD BEDSIDE DISPOSABLE ECG (1SET OF 5/EA)

## (undated) DEVICE — WIRE GUIDE SENSOR DUAL FLEX - 5/BX

## (undated) DEVICE — CANISTER SUCTION 3000ML MECHANICAL FILTER AUTO SHUTOFF MEDI-VAC NONSTERILE LF DISP (40EA/CA)

## (undated) DEVICE — GLOVE BIOGEL SZ 6.5 SURGICAL PF LTX (50PR/BX 4BX/CA)

## (undated) DEVICE — SPONGE GAUZESTER 4 X 4 4PLY - (128PK/CA)

## (undated) DEVICE — SODIUM CHL IRRIGATION 0.9% 1000ML (12EA/CA)

## (undated) DEVICE — ELECTRODE DUAL RETURN W/ CORD - (50/PK)

## (undated) DEVICE — SYRINGE NON SAFETY TB 1 CC 27 GA X 1/2 IN (100/BX 8BX/CA)

## (undated) DEVICE — WATER IRRIGATION STERILE 1000ML (12EA/CA)

## (undated) DEVICE — CANNULA O2 COMFORT SOFT EAR ADULT 7 FT TUBING (50/CA)

## (undated) DEVICE — SUCTION INSTRUMENT YANKAUER BULBOUS TIP W/O VENT (50EA/CA)

## (undated) DEVICE — GOWN WARMING STANDARD FLEX - (30/CA)

## (undated) DEVICE — MASK AIRWAY SIZE 3 UNIQUE SILICON (10/BX)

## (undated) DEVICE — SLEEVE VASO DVT COMPRESSION CALF MED - (10PR/CA)